# Patient Record
Sex: FEMALE | Race: WHITE | NOT HISPANIC OR LATINO | Employment: FULL TIME | URBAN - NONMETROPOLITAN AREA
[De-identification: names, ages, dates, MRNs, and addresses within clinical notes are randomized per-mention and may not be internally consistent; named-entity substitution may affect disease eponyms.]

---

## 2017-04-12 ENCOUNTER — OFFICE VISIT (OUTPATIENT)
Dept: URGENT CARE | Facility: PHYSICIAN GROUP | Age: 57
End: 2017-04-12
Payer: COMMERCIAL

## 2017-04-12 VITALS
HEIGHT: 68 IN | WEIGHT: 153 LBS | RESPIRATION RATE: 16 BRPM | OXYGEN SATURATION: 97 % | DIASTOLIC BLOOD PRESSURE: 82 MMHG | SYSTOLIC BLOOD PRESSURE: 138 MMHG | HEART RATE: 72 BPM | BODY MASS INDEX: 23.19 KG/M2 | TEMPERATURE: 98.7 F

## 2017-04-12 DIAGNOSIS — L30.9 DERMATITIS: ICD-10-CM

## 2017-04-12 DIAGNOSIS — L28.2 PRURITIC RASH: ICD-10-CM

## 2017-04-12 PROCEDURE — 99214 OFFICE O/P EST MOD 30 MIN: CPT | Performed by: PHYSICIAN ASSISTANT

## 2017-04-12 RX ORDER — TRIAMCINOLONE ACETONIDE 1 MG/G
1 CREAM TOPICAL 2 TIMES DAILY
Qty: 1 TUBE | Refills: 0 | Status: SHIPPED | OUTPATIENT
Start: 2017-04-12 | End: 2017-10-18

## 2017-04-12 RX ORDER — HYDROXYZINE HYDROCHLORIDE 25 MG/1
25 TABLET, FILM COATED ORAL 3 TIMES DAILY PRN
Qty: 30 TAB | Refills: 0 | Status: SHIPPED | OUTPATIENT
Start: 2017-04-12 | End: 2018-08-23

## 2017-04-12 NOTE — PROGRESS NOTES
Chief Complaint   Patient presents with   • Rash     x2days on both arms, itching, redness       HISTORY OF PRESENT ILLNESS: Patient is a 56 y.o. female who presents today because she has a rash on her left arm. It is itchy, red, bumpy. It has been going on for 2 days after she put some holistic medication on her arm. Denies any new contacts, otherwise    Patient Active Problem List    Diagnosis Date Noted   • Anxiety 11/06/2015   • Menopause syndrome 03/01/2012   • Hypertension    • Cervicalgia    • Tachycardia        Allergies:Review of patient's allergies indicates no known allergies.    Current Outpatient Prescriptions Ordered in [facility]   Medication Sig Dispense Refill   • triamcinolone acetonide (KENALOG) 0.1 % Cream Apply 1 Application to affected area(s) 2 times a day. 1 Tube 0   • hydrOXYzine (ATARAX) 25 MG Tab Take 1 Tab by mouth 3 times a day as needed for Itching. 30 Tab 0   • estradiol (ESTRACE) 0.5 MG tablet Take 1 Tab by mouth every day. 90 Tab 0   • lisinopril (PRINIVIL) 10 MG Tab Take 1 Tab by mouth every day. 90 Tab 0   • zolpidem (AMBIEN CR) 6.25 MG CR tablet Take 1 Tab by mouth at bedtime as needed for Sleep. 90 Tab 1   • Multiple Vitamin (MULTI-VITAMIN PO) Take  by mouth.     • Calcium 600 MG TABS Take 1,200 mg by mouth every day.       No current [facility]-ordered facility-administered medications on file.       Past Medical History   Diagnosis Date   • Cervicalgia    • Tachycardia, unspecified    • Shortness of breath    • Pap smear 03/13/2007   • Screening mammogram 02/26/2008   • Ultrasound scan abnormal 3/15/2007     Pelvic, enlarged uterus with miltiple masses in the uterine wall consisten with leiomyas   • Premenstrual tension syndromes    • Generalized anxiety disorder    • S/P VH (vaginal hysterectomy) 07/2007     secondary  to menorrhagia without BSO   • FH: colon cancer      maternal, typically diagnosed in 50's   • FH: stroke      paternal    • Essential hypertension, malignant    •  "Menopause syndrome 3/1/2012   • Hypertension    • Anxiety 11/6/2015       Social History   Substance Use Topics   • Smoking status: Never Smoker    • Smokeless tobacco: Never Used   • Alcohol Use: 1.2 oz/week     2 Standard drinks or equivalent per week       Family Status   Relation Status Death Age   • Mother Alive    • Father Alive      overweight     Family History   Problem Relation Age of Onset   • Hypertension Mother    • Arthritis Mother      severe osteoarthritis   • Hypertension Father        ROS:  Review of Systems   Constitutional: Negative for fever, chills, weight loss and malaise/fatigue.   HENT: Negative for ear pain, nosebleeds, congestion, sore throat and neck pain.    Eyes: Negative for blurred vision.   Respiratory: Negative for cough, sputum production, shortness of breath and wheezing.    Cardiovascular: Negative for chest pain, palpitations, orthopnea and leg swelling.   Gastrointestinal: Negative for heartburn, nausea, vomiting and abdominal pain.       Exam:  Blood pressure 138/82, pulse 72, temperature 37.1 °C (98.7 °F), resp. rate 16, height 1.715 m (5' 7.52\"), weight 69.4 kg (153 lb), SpO2 97 %, not currently breastfeeding.  General:  Well nourished, well developed female in NAD  Head:Normocephalic, atraumatic  Extremities: no clubbing, cyanosis, or edema.  Skin: On the dorsum of her left arm, between her wrist and her elbow, there is a mildly erythematous papular rash without any open sores, lesions or drainage Otherwise    Please note that this dictation was created using voice recognition software. I have made every reasonable attempt to correct obvious errors, but I expect that there are errors of grammar and possibly content that I did not discover before finalizing the note.    Assessment/Plan:  1. Dermatitis  triamcinolone acetonide (KENALOG) 0.1 % Cream   2. Pruritic rash  hydrOXYzine (ATARAX) 25 MG Tab       Followup with primary care in the next 7-10 days if not significantly " improving, return to the urgent care or go to the emergency room sooner for any worsening of symptoms.

## 2017-04-12 NOTE — MR AVS SNAPSHOT
"Fatoumata Gutiérrez   2017 4:20 PM   Office Visit   MRN: [id number]    Department:  [facility]   Dept Phone:  [phone]    Description:  Female : 1960   Provider:  Rivera Chester PA-C           Reason for Visit     Rash x2days on both arms, itching, redness      Allergies as of 2017     No Known Allergies      You were diagnosed with     Dermatitis   [204680]       Pruritic rash   [[id number]]         Vital Signs     Blood Pressure Pulse Temperature Respirations Height Weight    138/82 mmHg 72 37.1 °C (98.7 °F) 16 1.715 m (5' 7.52\") 69.4 kg (153 lb)    Body Mass Index Oxygen Saturation Breastfeeding? Smoking Status          23.60 kg/m2 97% No Never Smoker         Basic Information     Date Of Birth Sex Race Ethnicity Preferred Language    1960 Female White Non- English      Problem List              ICD-10-CM Priority Class Noted - Resolved    Hypertension I10   Unknown - Present    Cervicalgia M54.2   Unknown - Present    Tachycardia R00.0   Unknown - Present    Menopause syndrome N95.1   3/1/2012 - Present    Anxiety F41.9   2015 - Present      Health Maintenance        Date Due Completion Dates    IMM DTaP/Tdap/Td Vaccine (1 - Tdap) 2004    MAMMOGRAM 2015    PAP SMEAR 2016    COLONOSCOPY 2020 (Prv Comp)    Override on 2010: Previously completed ([facility], hemorrhoids, microscopic colitis, repeat 10 years)            Current Immunizations     INFLUENZA VACCINE H1N1 2010    Influenza Vaccine Pediatric 2010    Influenza Vaccine Quad Inj (Pf) 10/3/2015  9:05 AM    TD Vaccine 2004      Below and/or attached are the medications your provider expects you to take. Review all of your home medications and newly ordered medications with your provider and/or pharmacist. Follow medication instructions as directed by your provider and/or pharmacist. Please keep your medication list with you and share " with your provider. Update the information when medications are discontinued, doses are changed, or new medications (including over-the-counter products) are added; and carry medication information at all times in the event of emergency situations     Allergies:  No Known Allergies          Medications  Valid as of: April 12, 2017 -  5:07 PM    Generic Name Brand Name Tablet Size Instructions for use    Calcium (Tab) Calcium 600 MG Take 1,200 mg by mouth every day.        Estradiol (Tab) ESTRACE 0.5 MG Take 1 Tab by mouth every day.        HydrOXYzine HCl (Tab) ATARAX 25 MG Take 1 Tab by mouth 3 times a day as needed for Itching.        Lisinopril (Tab) PRINIVIL 10 MG Take 1 Tab by mouth every day.        Multiple Vitamin   Take  by mouth.        Triamcinolone Acetonide (Cream) KENALOG 0.1 % Apply 1 Application to affected area(s) 2 times a day.        Zolpidem Tartrate (Tab CR) AMBIEN CR 6.25 MG Take 1 Tab by mouth at bedtime as needed for Sleep.        .                 Medicines prescribed today were sent to:     [id number] - [facility] [place] - [address] AT [facility]    [address] [facility] [address]    Phone: [phone] Fax: [phone]    Open 24 Hours?: No    [facility] - [address] [facility] [address]    Phone: [phone] Fax: [phone]    Open 24 Hours?: No      Medication refill instructions:       If your prescription bottle indicates you have medication refills left, it is not necessary to call your provider’s office. Please contact your pharmacy and they will refill your medication.    If your prescription bottle indicates you do not have any refills left, you may request refills at any time through one of the following ways: The online [employer] system (except Urgent Care), by calling your provider’s office, or by asking your pharmacy to contact your provider’s office with a refill request.  Medication refills are processed only during regular business hours and may not be available until the next business day. Your provider may request additional information or to have a follow-up visit with you prior to refilling your medication.   *Please Note: Medication refills are assigned a new Rx number when refilled electronically. Your pharmacy may indicate that no refills were authorized even though a new prescription for the same medication is available at the pharmacy. Please request the medicine by name with the pharmacy before contacting your provider for a refill.           [employer] Access Code: Activation code not generated  Current Limbo Status: Active

## 2017-07-12 ENCOUNTER — OFFICE VISIT (OUTPATIENT)
Dept: MEDICAL GROUP | Facility: PHYSICIAN GROUP | Age: 57
End: 2017-07-12
Payer: COMMERCIAL

## 2017-07-12 VITALS
HEART RATE: 63 BPM | WEIGHT: 154 LBS | OXYGEN SATURATION: 96 % | BODY MASS INDEX: 24.17 KG/M2 | RESPIRATION RATE: 16 BRPM | HEIGHT: 67 IN | DIASTOLIC BLOOD PRESSURE: 90 MMHG | TEMPERATURE: 99.1 F | SYSTOLIC BLOOD PRESSURE: 136 MMHG

## 2017-07-12 DIAGNOSIS — F51.01 PRIMARY INSOMNIA: ICD-10-CM

## 2017-07-12 DIAGNOSIS — N95.1 MENOPAUSE SYNDROME: ICD-10-CM

## 2017-07-12 DIAGNOSIS — I10 ESSENTIAL HYPERTENSION: ICD-10-CM

## 2017-07-12 DIAGNOSIS — I47.10 PSVT (PAROXYSMAL SUPRAVENTRICULAR TACHYCARDIA): ICD-10-CM

## 2017-07-12 PROCEDURE — 99214 OFFICE O/P EST MOD 30 MIN: CPT | Performed by: INTERNAL MEDICINE

## 2017-07-12 RX ORDER — ESTRADIOL 0.5 MG/1
0.5 TABLET ORAL DAILY
Qty: 90 TAB | Refills: 0 | Status: SHIPPED | OUTPATIENT
Start: 2017-07-12 | End: 2017-07-19

## 2017-07-12 RX ORDER — LISINOPRIL 10 MG/1
10 TABLET ORAL DAILY
Qty: 90 TAB | Refills: 0 | Status: SHIPPED | OUTPATIENT
Start: 2017-07-12 | End: 2017-10-18 | Stop reason: SDUPTHER

## 2017-07-12 RX ORDER — METOPROLOL SUCCINATE 25 MG/1
25 TABLET, EXTENDED RELEASE ORAL DAILY
COMMUNITY
End: 2017-11-29 | Stop reason: SDUPTHER

## 2017-07-12 RX ORDER — ZOLPIDEM TARTRATE 6.25 MG/1
6.25 TABLET, FILM COATED, EXTENDED RELEASE ORAL NIGHTLY PRN
Qty: 90 TAB | Refills: 1 | Status: SHIPPED | OUTPATIENT
Start: 2017-07-12 | End: 2018-08-23

## 2017-07-12 ASSESSMENT — PAIN SCALES - GENERAL: PAINLEVEL: NO PAIN

## 2017-07-12 ASSESSMENT — PATIENT HEALTH QUESTIONNAIRE - PHQ9: CLINICAL INTERPRETATION OF PHQ2 SCORE: 0

## 2017-07-12 NOTE — MR AVS SNAPSHOT
"Fatoumata Gutiérrez   2017 9:00 AM   Office Visit   MRN: [id number]    Department:  [facility]   Dept Phone:  [phone]    Description:  Female : 1960   Provider:  Cyndie BLANK M.D.           Reason for Visit     Medication Refill lisinopril     Other pt would like bone density study, Mammo, PAP    Immunizations TDAP       Allergies as of 2017     No Known Allergies      Vital Signs     Blood Pressure Pulse Temperature Respirations Height Weight    136/90 mmHg 63 37.3 °C (99.1 °F) 16 1.702 m (5' 7.01\") 69.854 kg (154 lb)    Body Mass Index Oxygen Saturation Smoking Status             24.11 kg/m2 96% Never Smoker          Basic Information     Date Of Birth Sex Race Ethnicity Preferred Language    1960 Female White Non- English      Your appointments     Oct 24, 2017  7:15 AM   Adult Draw/Collection with [facility]   [facility] (--)    [id number] [place] [address]   [phone]            2017  4:00 PM   Established Patient with Cyndie BLANK M.D.   [facility] (--)    [address] [facility] [place] [id number] [phone]           You will be receiving a confirmation call a few days before your appointment from our automated call confirmation system.              Problem List              ICD-10-CM Priority Class Noted - Resolved    Hypertension I10   Unknown - Present    Cervicalgia M54.2   Unknown - Present    Tachycardia R00.0   Unknown - Present    Menopause syndrome N95.1   3/1/2012 - Present    Anxiety F41.9   2015 - Present      Health Maintenance        Date Due Completion Dates    IMM DTaP/Tdap/Td Vaccine (1 - Tdap) 2004    MAMMOGRAM 2015    PAP SMEAR 2016    IMM INFLUENZA (1) 2017 10/3/2015, 2010    COLONOSCOPY 2020 (Prv Comp)    Override on 2010: Previously completed ([facility], hemorrhoids, microscopic colitis, repeat 10 " years)            Current Immunizations     INFLUENZA VACCINE H1N1 2/1/2010    Influenza Vaccine Pediatric 2/1/2010    Influenza Vaccine Quad Inj (Pf) 10/3/2015  9:05 AM    MMR Vaccine 8/9/2000    TD Vaccine 1/1/2004      Below and/or attached are the medications your provider expects you to take. Review all of your home medications and newly ordered medications with your provider and/or pharmacist. Follow medication instructions as directed by your provider and/or pharmacist. Please keep your medication list with you and share with your provider. Update the information when medications are discontinued, doses are changed, or new medications (including over-the-counter products) are added; and carry medication information at all times in the event of emergency situations     Allergies:  No Known Allergies          Medications  Valid as of: July 12, 2017 -  9:52 AM    Generic Name Brand Name Tablet Size Instructions for use    Calcium (Tab) Calcium 600 MG Take 1,200 mg by mouth every day.        Estradiol (Tab) ESTRACE 0.5 MG Take 1 Tab by mouth every day.        HydrOXYzine HCl (Tab) ATARAX 25 MG Take 1 Tab by mouth 3 times a day as needed for Itching.        Lisinopril (Tab) PRINIVIL 10 MG Take 1 Tab by mouth every day.        Metoprolol Succinate (TABLET SR 24 HR) TOPROL XL 25 MG Take 25 mg by mouth every day.        Multiple Vitamin   Take  by mouth.        Triamcinolone Acetonide (Cream) KENALOG 0.1 % Apply 1 Application to affected area(s) 2 times a day.        Zolpidem Tartrate (Tab CR) AMBIEN CR 6.25 MG Take 1 Tab by mouth at bedtime as needed for Sleep.        .                 Medicines prescribed today were sent to:     [id number] - [place], [place] - [address] AT [facility] [address]    [address] [place] [address]    Phone: [phone] Fax: [phone]    Open 24 Hours?: No    [facility] - [place], [place] - [address] [place] [address] [address] [place]  [place] [address]    Phone: [phone] Fax: [phone]    Open 24 Hours?: No      Medication refill instructions:       If your prescription bottle indicates you have medication refills left, it is not necessary to call your provider’s office. Please contact your pharmacy and they will refill your medication.    If your prescription bottle indicates you do not have any refills left, you may request refills at any time through one of the following ways: The online [employer] system (except Urgent Care), by calling your provider’s office, or by asking your pharmacy to contact your provider’s office with a refill request. Medication refills are processed only during regular business hours and may not be available until the next business day. Your provider may request additional information or to have a follow-up visit with you prior to refilling your medication.   *Please Note: Medication refills are assigned a new Rx number when refilled electronically. Your pharmacy may indicate that no refills were authorized even though a new prescription for the same medication is available at the pharmacy. Please request the medicine by name with the pharmacy before contacting your provider for a refill.           Pervasip Access Code: Activation code not generated  Current Pervasip Status: Active

## 2017-07-13 NOTE — PROGRESS NOTES
Chief Complaint   Patient presents with   • Medication Refill     lisinopril    • Other     pt would like bone density study, Mammo, PAP   • Immunizations     TDAP        HISTORY OF PRESENT ILLNESS: Patient is a 56 y.o. female established patient who presents today to discuss the medical issues below.    Hypertension  Patient continues on lisinopril as well as low-dose beta-blockade for a PSVT. Not following at home denies headaches chest pain edema.    PSVT (paroxysmal supraventricular tachycardia) (CMS-HCC)  Patient reports diagnosed with PSVT, had seen cardiology cardiology, had medications instituted as metoprolol SR 25 mg once a day and reports she had been doing well until last week when she had a run of palpitations that lasted approximately one hour. She has a follow-up appointment with cardiology on August 25. Palpitations spontaneously resolved with resting.    Menopause syndrome  Patient is status post hysterectomy and wonders if she needs a Pap smear done. She's not having any significant sweats she is continuing on estradiol tablets 0.5 mg daily.      Patient Active Problem List    Diagnosis Date Noted   • Anxiety 11/06/2015   • Menopause syndrome 03/01/2012   • Hypertension    • Cervicalgia    • PSVT (paroxysmal supraventricular tachycardia) (CMS-[facility])        Allergies:Review of patient's allergies indicates no known allergies.    Current Outpatient Prescriptions   Medication Sig Dispense Refill   • metoprolol SR (TOPROL XL) 25 MG TABLET SR 24 HR Take 25 mg by mouth every day.     • estradiol (ESTRACE) 0.5 MG tablet Take 1 Tab by mouth every day. 90 Tab 0   • lisinopril (PRINIVIL) 10 MG Tab Take 1 Tab by mouth every day. 90 Tab 0   • zolpidem (AMBIEN CR) 6.25 MG CR tablet Take 1 Tab by mouth at bedtime as needed for Sleep. 90 Tab 1   • Multiple Vitamin (MULTI-VITAMIN PO) Take  by mouth.     • Calcium 600 MG TABS Take 1,200 mg by mouth every day.     • triamcinolone acetonide (KENALOG) 0.1 % Cream  "Apply 1 Application to affected area(s) 2 times a day. (Patient not taking: Reported on 7/12/2017) 1 Tube 0   • hydrOXYzine (ATARAX) 25 MG Tab Take 1 Tab by mouth 3 times a day as needed for Itching. (Patient not taking: Reported on 7/12/2017) 30 Tab 0     No current facility-administered medications for this visit.         Past Medical History   Diagnosis Date   • Cervicalgia    • Tachycardia, unspecified    • Shortness of breath    • Pap smear 03/13/2007   • Screening mammogram 02/26/2008   • Ultrasound scan abnormal 3/15/2007     Pelvic, enlarged uterus with miltiple masses in the uterine wall consisten with leiomyas   • Premenstrual tension syndromes    • Generalized anxiety disorder    • S/P VH (vaginal hysterectomy) 07/2007     secondary  to menorrhagia without BSO   • FH: colon cancer      maternal, typically diagnosed in 50's   • FH: stroke      paternal    • Essential hypertension, malignant    • Menopause syndrome 3/1/2012   • Hypertension    • Anxiety 11/6/2015       Social History   Substance Use Topics   • Smoking status: Never Smoker    • Smokeless tobacco: Never Used   • Alcohol Use: No       Family Status   Relation Status Death Age   • Mother Alive    • Father Alive      overweight     Family History   Problem Relation Age of Onset   • Hypertension Mother    • Arthritis Mother      severe osteoarthritis   • Hypertension Father        ROS:    Respiratory: Negative for cough, sputum production, shortness of breath or wheezing.    Cardiovascular: Negative for chest pain, palpitations, orthopnea, dyspnea with exertion or edema.   Gastrointestinal: Negative for GI upset, nausea, vomiting, abdominal pain, constipation or diarrhea.   Genitourinary: Negative for dysuria, urgency, hesitancy or frequency.       Exam:    Blood pressure 136/90, pulse 63, temperature 37.3 °C (99.1 °F), resp. rate 16, height 1.702 m (5' 7.01\"), weight 69.854 kg (154 lb), SpO2 96 %.  General:  Well nourished, well developed female " in NAD.  HENT: Normocephalic, bilateral TMs are intact, nasal and oral mucosa with no lesions,   Neck: Supple without bruit. Thyroid is not enlarged.  Pulmonary: Clear to ausculation and percussion.  Normal effort. No rales, rhonchi, or wheezing.  Cardiovascular: Regular rate and rhythm without murmur.   Abdomen: Normal bowel sounds soft and nontender no palpable liver spleen bladder mass.  Extremities: No LE edema noted.  Neuro: Grossly nonfocal.  Psych: Alert and oriented to person, place, and time. Appropriate mood and conversation.        This dictation was created using voice recognition software. I have made reasonable attempts to correct errors, however, errors of grammar and content may exist.          Assessment/Plan:    1. Essential hypertension  Borderline blood pressure discussed increasing dose of beta blocker for symptomatic PSVT. Ultimately she opts for monitoring with cardiology prior to medication changes. Lisinopril refill written    2. PSVT (paroxysmal supraventricular tachycardia) (CMS-HCC)  As discussed above for now monitoring clinically    3. Menopause syndrome  Schedule mammogram, Pap smear. She is status post hysterectomy however uncertain if her cervix remains or not. I recommended one Pap if we see absence of the cervix then she can go to when necessary Pap smears. Discuss estrogen use of follow-up. Bone density screening    4. Vaccination  Need for T dab order written.    5 Insomnia  Computers down and initially unable to do . Subsequent to her leaving the office visit  indicates no adverse use refill written.    Patient was seen for  25 minutes face to face of which more than 50% of the time was spent in counseling and coordination of care regarding the above problems.

## 2017-07-13 NOTE — ASSESSMENT & PLAN NOTE
Patient reports diagnosed with PSVT, had seen cardiology cardiology, had medications instituted as metoprolol SR 25 mg once a day and reports she had been doing well until last week when she had a run of palpitations that lasted approximately one hour. She has a follow-up appointment with cardiology on August 25. Palpitations spontaneously resolved with resting.

## 2017-07-13 NOTE — ASSESSMENT & PLAN NOTE
Patient continues on lisinopril as well as low-dose beta-blockade for a PSVT. Not following at home denies headaches chest pain edema.

## 2017-07-13 NOTE — ASSESSMENT & PLAN NOTE
Patient is status post hysterectomy and wonders if she needs a Pap smear done. She's not having any significant sweats she is continuing on estradiol tablets 0.5 mg daily.

## 2017-07-19 RX ORDER — ESTRADIOL 0.5 MG/1
TABLET ORAL
Qty: 90 TAB | Refills: 0 | Status: SHIPPED | OUTPATIENT
Start: 2017-07-19 | End: 2018-08-23

## 2017-07-19 NOTE — TELEPHONE ENCOUNTER
Was the patient seen in the last year in this department? Yes     Does patient have an active prescription for medications requested? No     Received Request Via: Pharmacy      Pt met protocol?: Yes, ov 7/12/17, pt has rx at local pharmacy but wants to get thru mail order #[id number]

## 2017-10-18 ENCOUNTER — OFFICE VISIT (OUTPATIENT)
Dept: URGENT CARE | Facility: PHYSICIAN GROUP | Age: 57
End: 2017-10-18
Payer: COMMERCIAL

## 2017-10-18 ENCOUNTER — HOSPITAL ENCOUNTER (OUTPATIENT)
Facility: MEDICAL CENTER | Age: 57
End: 2017-10-18
Attending: PHYSICIAN ASSISTANT
Payer: COMMERCIAL

## 2017-10-18 VITALS
DIASTOLIC BLOOD PRESSURE: 76 MMHG | TEMPERATURE: 98.1 F | OXYGEN SATURATION: 96 % | BODY MASS INDEX: 24.17 KG/M2 | RESPIRATION RATE: 16 BRPM | HEART RATE: 93 BPM | SYSTOLIC BLOOD PRESSURE: 126 MMHG | HEIGHT: 67 IN | WEIGHT: 154 LBS

## 2017-10-18 DIAGNOSIS — I10 ESSENTIAL HYPERTENSION: ICD-10-CM

## 2017-10-18 DIAGNOSIS — N30.00 ACUTE CYSTITIS WITHOUT HEMATURIA: ICD-10-CM

## 2017-10-18 DIAGNOSIS — Z13.220 SCREENING, LIPID: ICD-10-CM

## 2017-10-18 LAB
APPEARANCE UR: NORMAL
BILIRUB UR STRIP-MCNC: NORMAL MG/DL
COLOR UR AUTO: NORMAL
GLUCOSE UR STRIP.AUTO-MCNC: NORMAL MG/DL
KETONES UR STRIP.AUTO-MCNC: NORMAL MG/DL
LEUKOCYTE ESTERASE UR QL STRIP.AUTO: NORMAL
NITRITE UR QL STRIP.AUTO: NORMAL
PH UR STRIP.AUTO: 6 [PH] (ref 5–8)
PROT UR QL STRIP: 30 MG/DL
RBC UR QL AUTO: NORMAL
SP GR UR STRIP.AUTO: 1.01
UROBILINOGEN UR STRIP-MCNC: NORMAL MG/DL

## 2017-10-18 PROCEDURE — 99214 OFFICE O/P EST MOD 30 MIN: CPT | Performed by: PHYSICIAN ASSISTANT

## 2017-10-18 PROCEDURE — 87086 URINE CULTURE/COLONY COUNT: CPT

## 2017-10-18 PROCEDURE — 87186 SC STD MICRODIL/AGAR DIL: CPT

## 2017-10-18 PROCEDURE — 87077 CULTURE AEROBIC IDENTIFY: CPT

## 2017-10-18 PROCEDURE — 81002 URINALYSIS NONAUTO W/O SCOPE: CPT | Performed by: PHYSICIAN ASSISTANT

## 2017-10-18 RX ORDER — NITROFURANTOIN 25; 75 MG/1; MG/1
100 CAPSULE ORAL EVERY 12 HOURS
Qty: 10 CAP | Refills: 0 | Status: SHIPPED | OUTPATIENT
Start: 2017-10-18 | End: 2017-10-23

## 2017-10-18 RX ORDER — PHENAZOPYRIDINE HYDROCHLORIDE 200 MG/1
200 TABLET, FILM COATED ORAL 3 TIMES DAILY
Qty: 6 TAB | Refills: 0 | Status: SHIPPED | OUTPATIENT
Start: 2017-10-18 | End: 2017-10-20

## 2017-10-18 ASSESSMENT — ENCOUNTER SYMPTOMS
FEVER: 0
SWEATS: 0
ABDOMINAL PAIN: 1
NAUSEA: 0
CHILLS: 0
VOMITING: 0
FLANK PAIN: 0

## 2017-10-19 DIAGNOSIS — N30.00 ACUTE CYSTITIS WITHOUT HEMATURIA: ICD-10-CM

## 2017-10-19 RX ORDER — LISINOPRIL 10 MG/1
10 TABLET ORAL DAILY
Qty: 90 TAB | Refills: 0 | Status: SHIPPED | OUTPATIENT
Start: 2017-10-19 | End: 2017-11-29 | Stop reason: SDUPTHER

## 2017-10-19 NOTE — PROGRESS NOTES
Subjective:      Fatoumata Gutiérrez is a 57 y.o. female who presents with Dysuria (x1day back/abd pain)              One-day history of dysuria, urgency, frequency, and lower abdominal discomfort. No fevers, chills, or other complaints.      Dysuria    This is a new problem. The current episode started today. The problem occurs every urination. The problem has been waxing and waning. The quality of the pain is described as aching and burning. The pain is moderate. There has been no fever. Associated symptoms include frequency and urgency. Pertinent negatives include no chills, discharge, flank pain, hematuria, hesitancy, nausea, possible pregnancy, sweats or vomiting. She has tried nothing for the symptoms. The treatment provided no relief.       Review of Systems   Constitutional: Negative for chills and fever.   Gastrointestinal: Positive for abdominal pain. Negative for nausea and vomiting.   Genitourinary: Positive for dysuria, frequency and urgency. Negative for flank pain, hematuria and hesitancy.     Allergies:Review of patient's allergies indicates no known allergies.    Current Outpatient Prescriptions Ordered in [facility]   Medication Sig Dispense Refill   • nitrofurantoin monohydr macro (MACROBID) 100 MG Cap Take 1 Cap by mouth every 12 hours for 5 days. 10 Cap 0   • phenazopyridine (PYRIDIUM) 200 MG Tab Take 1 Tab by mouth 3 times a day for 2 days. 6 Tab 0   • estradiol (ESTRACE) 0.5 MG tablet Take 1 tablet by mouth  every day 90 Tab 0   • metoprolol SR (TOPROL XL) 25 MG TABLET SR 24 HR Take 25 mg by mouth every day.     • lisinopril (PRINIVIL) 10 MG Tab Take 1 Tab by mouth every day. 90 Tab 0   • zolpidem (AMBIEN CR) 6.25 MG CR tablet Take 1 Tab by mouth at bedtime as needed for Sleep. 90 Tab 1   • hydrOXYzine (ATARAX) 25 MG Tab Take 1 Tab by mouth 3 times a day as needed for Itching. 30 Tab 0   • Multiple Vitamin (MULTI-VITAMIN PO) Take  by mouth.     • Calcium 600 MG TABS Take 1,200 mg by mouth every day.    "    No current [facility]-ordered facility-administered medications on file.        Past Medical History:   Diagnosis Date   • Anxiety 11/6/2015   • Menopause syndrome 3/1/2012   • Screening mammogram 02/26/2008   • S/P VH (vaginal hysterectomy) 07/2007    secondary  to menorrhagia without BSO   • Ultrasound scan abnormal 3/15/2007    Pelvic, enlarged uterus with miltiple masses in the uterine wall consisten with leiomyas   • Pap smear 03/13/2007   • Cervicalgia    • Essential hypertension, malignant    • FH: colon cancer     maternal, typically diagnosed in 50's   • FH: stroke     paternal    • Generalized anxiety disorder    • Hypertension    • Premenstrual tension syndromes    • Shortness of breath    • Tachycardia, unspecified        Social History   Substance Use Topics   • Smoking status: Never Smoker   • Smokeless tobacco: Never Used   • Alcohol use No       Family Status   Relation Status   • Mother Alive   • Father Alive    overweight     Family History   Problem Relation Age of Onset   • Hypertension Mother    • Arthritis Mother      severe osteoarthritis   • Hypertension Father           Objective:     /76   Pulse 93   Temp 36.7 °C (98.1 °F)   Resp 16   Ht 1.702 m (5' 7.01\")   Wt 69.9 kg (154 lb)   SpO2 96%   Breastfeeding? No   BMI 24.11 kg/m²      Physical Exam   Constitutional: She is oriented to person, place, and time. She appears well-developed and well-nourished. No distress.   HENT:   Head: Normocephalic and atraumatic.   Eyes: Right eye exhibits no discharge. Left eye exhibits no discharge.   Neck: Normal range of motion. Neck supple.   Cardiovascular: Normal rate and regular rhythm.    Pulmonary/Chest: Effort normal and breath sounds normal.   Abdominal: Soft. Bowel sounds are normal. She exhibits no distension. There is tenderness (very mild, suprapubic). There is no guarding.   No CVA tenderness   Neurological: She is alert and oriented to person, place, and time.   Skin: Skin is warm " and dry. She is not diaphoretic.   Psychiatric: She has a normal mood and affect. Her behavior is normal. Judgment and thought content normal.   Nursing note and vitals reviewed.    Labs: Urinalysis positive for leukocytes, nitrates, blood, and protein          Assessment/Plan:     1. Acute cystitis without hematuria  POCT Urinalysis    Urine Culture    nitrofurantoin monohydr macro (MACROBID) 100 MG Cap    phenazopyridine (PYRIDIUM) 200 MG Tab    Symptoms for one day, worsening. Urine positive for leukocytes, nitrates, and blood. Start Macrobid. Culture urine. Follow-up with PCP as needed.       [employer] Interactive Patient Education given: UTI    Please note that this dictation was created using voice recognition software. I have made every reasonable attempt to correct obvious errors, but I expect that there are errors of grammar and possibly content that I did not discover before finalizing the note.

## 2017-10-19 NOTE — PATIENT INSTRUCTIONS
Urinary Tract Infection  A urinary tract infection (UTI) can occur any place along the urinary tract. The tract includes the kidneys, ureters, bladder, and urethra. A type of germ called bacteria often causes a UTI. UTIs are often helped with antibiotic medicine.   HOME CARE   · If given, take antibiotics as told by your doctor. Finish them even if you start to feel better.  · Drink enough fluids to keep your pee (urine) clear or pale yellow.  · Avoid tea, drinks with caffeine, and bubbly (carbonated) drinks.  · Pee often. Avoid holding your pee in for a long time.  · Pee before and after having sex (intercourse).  · Wipe from front to back after you poop (bowel movement) if you are a woman. Use each tissue only once.  GET HELP RIGHT AWAY IF:   · You have back pain.  · You have lower belly (abdominal) pain.  · You have chills.  · You feel sick to your stomach (nauseous).  · You throw up (vomit).  · Your burning or discomfort with peeing does not go away.  · You have a fever.  · Your symptoms are not better in 3 days.  MAKE SURE YOU:   · Understand these instructions.  · Will watch your condition.  · Will get help right away if you are not doing well or get worse.     This information is not intended to replace advice given to you by your health care provider. Make sure you discuss any questions you have with your health care provider.     Document Released: 06/05/2009 Document Revised: 01/08/2016 Document Reviewed: 07/18/2013  [employer] Interactive Patient Education ©2016 [employer]

## 2017-10-19 NOTE — TELEPHONE ENCOUNTER
Refill is written, patient has OV 11/3/17, I recommend lab draw prior to the OV, please schedule that.

## 2017-10-21 LAB
BACTERIA UR CULT: ABNORMAL
SIGNIFICANT IND 70042: ABNORMAL
SOURCE SOURCE: ABNORMAL

## 2017-10-24 ENCOUNTER — HOSPITAL ENCOUNTER (OUTPATIENT)
Dept: LAB | Facility: MEDICAL CENTER | Age: 57
End: 2017-10-24
Attending: INTERNAL MEDICINE
Payer: COMMERCIAL

## 2017-10-24 DIAGNOSIS — Z13.220 SCREENING, LIPID: ICD-10-CM

## 2017-10-24 DIAGNOSIS — I10 ESSENTIAL HYPERTENSION: ICD-10-CM

## 2017-10-24 LAB
BASOPHILS # BLD AUTO: 0.4 % (ref 0–1.8)
BASOPHILS # BLD: 0.04 K/UL (ref 0–0.12)
EOSINOPHIL # BLD AUTO: 0.1 K/UL (ref 0–0.51)
EOSINOPHIL NFR BLD: 1 % (ref 0–6.9)
ERYTHROCYTE [DISTWIDTH] IN BLOOD BY AUTOMATED COUNT: 42.2 FL (ref 35.9–50)
HCT VFR BLD AUTO: 43.6 % (ref 37–47)
HGB BLD-MCNC: 14.1 G/DL (ref 12–16)
IMM GRANULOCYTES # BLD AUTO: 0.04 K/UL (ref 0–0.11)
IMM GRANULOCYTES NFR BLD AUTO: 0.4 % (ref 0–0.9)
LYMPHOCYTES # BLD AUTO: 1.34 K/UL (ref 1–4.8)
LYMPHOCYTES NFR BLD: 13.8 % (ref 22–41)
MCH RBC QN AUTO: 29.9 PG (ref 27–33)
MCHC RBC AUTO-ENTMCNC: 32.3 G/DL (ref 33.6–35)
MCV RBC AUTO: 92.4 FL (ref 81.4–97.8)
MONOCYTES # BLD AUTO: 0.69 K/UL (ref 0–0.85)
MONOCYTES NFR BLD AUTO: 7.1 % (ref 0–13.4)
NEUTROPHILS # BLD AUTO: 7.48 K/UL (ref 2–7.15)
NEUTROPHILS NFR BLD: 77.3 % (ref 44–72)
NRBC # BLD AUTO: 0 K/UL
NRBC BLD AUTO-RTO: 0 /100 WBC
PLATELET # BLD AUTO: 305 K/UL (ref 164–446)
PMV BLD AUTO: 10.3 FL (ref 9–12.9)
RBC # BLD AUTO: 4.72 M/UL (ref 4.2–5.4)
WBC # BLD AUTO: 9.7 K/UL (ref 4.8–10.8)

## 2017-10-24 PROCEDURE — 80053 COMPREHEN METABOLIC PANEL: CPT

## 2017-10-24 PROCEDURE — 36415 COLL VENOUS BLD VENIPUNCTURE: CPT

## 2017-10-24 PROCEDURE — 85025 COMPLETE CBC W/AUTO DIFF WBC: CPT

## 2017-10-24 PROCEDURE — 80061 LIPID PANEL: CPT

## 2017-10-25 LAB
ALBUMIN SERPL BCP-MCNC: 3.9 G/DL (ref 3.2–4.9)
ALBUMIN/GLOB SERPL: 1.3 G/DL
ALP SERPL-CCNC: 102 U/L (ref 30–99)
ALT SERPL-CCNC: 67 U/L (ref 2–50)
ANION GAP SERPL CALC-SCNC: 6 MMOL/L (ref 0–11.9)
AST SERPL-CCNC: 36 U/L (ref 12–45)
BILIRUB SERPL-MCNC: 0.6 MG/DL (ref 0.1–1.5)
BUN SERPL-MCNC: 17 MG/DL (ref 8–22)
CALCIUM SERPL-MCNC: 9.1 MG/DL (ref 8.5–10.5)
CHLORIDE SERPL-SCNC: 104 MMOL/L (ref 96–112)
CHOLEST SERPL-MCNC: 167 MG/DL (ref 100–199)
CO2 SERPL-SCNC: 27 MMOL/L (ref 20–33)
CREAT SERPL-MCNC: 0.81 MG/DL (ref 0.5–1.4)
GFR SERPL CREATININE-BSD FRML MDRD: >60 ML/MIN/1.73 M 2
GLOBULIN SER CALC-MCNC: 2.9 G/DL (ref 1.9–3.5)
GLUCOSE SERPL-MCNC: 101 MG/DL (ref 65–99)
HDLC SERPL-MCNC: 61 MG/DL
LDLC SERPL CALC-MCNC: 88 MG/DL
POTASSIUM SERPL-SCNC: 4.3 MMOL/L (ref 3.6–5.5)
PROT SERPL-MCNC: 6.8 G/DL (ref 6–8.2)
SODIUM SERPL-SCNC: 137 MMOL/L (ref 135–145)
TRIGL SERPL-MCNC: 89 MG/DL (ref 0–149)

## 2017-11-02 ENCOUNTER — TELEPHONE (OUTPATIENT)
Dept: MEDICAL GROUP | Facility: PHYSICIAN GROUP | Age: 57
End: 2017-11-02

## 2017-11-29 ENCOUNTER — OFFICE VISIT (OUTPATIENT)
Dept: MEDICAL GROUP | Facility: PHYSICIAN GROUP | Age: 57
End: 2017-11-29
Payer: COMMERCIAL

## 2017-11-29 VITALS
BODY MASS INDEX: 23.49 KG/M2 | SYSTOLIC BLOOD PRESSURE: 130 MMHG | OXYGEN SATURATION: 94 % | HEIGHT: 68 IN | TEMPERATURE: 97.8 F | HEART RATE: 67 BPM | DIASTOLIC BLOOD PRESSURE: 80 MMHG | WEIGHT: 155 LBS | RESPIRATION RATE: 16 BRPM

## 2017-11-29 DIAGNOSIS — N95.1 MENOPAUSE SYNDROME: ICD-10-CM

## 2017-11-29 DIAGNOSIS — Z23 NEEDS FLU SHOT: ICD-10-CM

## 2017-11-29 DIAGNOSIS — R73.01 FASTING HYPERGLYCEMIA: ICD-10-CM

## 2017-11-29 DIAGNOSIS — I47.10 PSVT (PAROXYSMAL SUPRAVENTRICULAR TACHYCARDIA): ICD-10-CM

## 2017-11-29 DIAGNOSIS — I10 ESSENTIAL HYPERTENSION: ICD-10-CM

## 2017-11-29 DIAGNOSIS — Z23 NEED FOR TDAP VACCINATION: ICD-10-CM

## 2017-11-29 PROCEDURE — 90472 IMMUNIZATION ADMIN EACH ADD: CPT | Performed by: INTERNAL MEDICINE

## 2017-11-29 PROCEDURE — 90686 IIV4 VACC NO PRSV 0.5 ML IM: CPT | Performed by: INTERNAL MEDICINE

## 2017-11-29 PROCEDURE — 90471 IMMUNIZATION ADMIN: CPT | Performed by: INTERNAL MEDICINE

## 2017-11-29 PROCEDURE — 90715 TDAP VACCINE 7 YRS/> IM: CPT | Performed by: INTERNAL MEDICINE

## 2017-11-29 PROCEDURE — 99214 OFFICE O/P EST MOD 30 MIN: CPT | Mod: 25 | Performed by: INTERNAL MEDICINE

## 2017-11-29 RX ORDER — LISINOPRIL 10 MG/1
10 TABLET ORAL DAILY
Qty: 90 TAB | Refills: 3 | Status: SHIPPED | OUTPATIENT
Start: 2017-11-29 | End: 2018-08-23 | Stop reason: SDUPTHER

## 2017-11-29 RX ORDER — METOPROLOL SUCCINATE 25 MG/1
25 TABLET, EXTENDED RELEASE ORAL DAILY
Qty: 90 TAB | Refills: 3 | Status: SHIPPED | OUTPATIENT
Start: 2017-11-29 | End: 2018-08-23

## 2017-11-29 ASSESSMENT — PAIN SCALES - GENERAL: PAINLEVEL: NO PAIN

## 2017-11-29 NOTE — ASSESSMENT & PLAN NOTE
Patient here for follow-up has had fasting glucose levels borderline. She is working on a low carbohydrate diet exercising regularly. Denies polydipsia polyuria.

## 2017-11-29 NOTE — ASSESSMENT & PLAN NOTE
Rarely following at home, feels good.  Continues on the lisinopril 10 mg daily with the metoprolol SR 25 mg, rare palpitation as discussed

## 2017-11-29 NOTE — PROGRESS NOTES
Chief Complaint   Patient presents with   • Thyroid Problem     lab results    • Immunizations     flu       HISTORY OF PRESENT ILLNESS: Patient is a 57 y.o. female established patient who presents today to discuss the medical issues below.    PSVT (paroxysmal supraventricular tachycardia) (CMS-HCC)  Patient states no recent palpitations, continues on the low dose metoprolol.  Patient continues to see Dr Jacobs.      Hypertension  Rarely following at home, feels good.  Continues on the lisinopril 10 mg daily with the metoprolol SR 25 mg, rare palpitation as discussed     Menopause syndrome  Patient wonders about the estradiol 0.5 mg.      Fasting hyperglycemia  Patient here for follow-up has had fasting glucose levels borderline. She is working on a low carbohydrate diet exercising regularly. Denies polydipsia polyuria.      Patient Active Problem List    Diagnosis Date Noted   • Fasting hyperglycemia 11/29/2017   • Anxiety 11/06/2015   • Menopause syndrome 03/01/2012   • Hypertension    • Cervicalgia    • PSVT (paroxysmal supraventricular tachycardia) (CMS-HCC)        Allergies:Patient has no known allergies.    Current Outpatient Prescriptions   Medication Sig Dispense Refill   • lisinopril (PRINIVIL) 10 MG Tab Take 1 Tab by mouth every day. 90 Tab 0   • estradiol (ESTRACE) 0.5 MG tablet Take 1 tablet by mouth  every day 90 Tab 0   • metoprolol SR (TOPROL XL) 25 MG TABLET SR 24 HR Take 25 mg by mouth every day.     • Multiple Vitamin (MULTI-VITAMIN PO) Take  by mouth.     • Calcium 600 MG TABS Take 1,200 mg by mouth every day.     • zolpidem (AMBIEN CR) 6.25 MG CR tablet Take 1 Tab by mouth at bedtime as needed for Sleep. 90 Tab 1   • hydrOXYzine (ATARAX) 25 MG Tab Take 1 Tab by mouth 3 times a day as needed for Itching. 30 Tab 0     No current facility-administered medications for this visit.          Past Medical History:   Diagnosis Date   • Anxiety 11/6/2015   • Cervicalgia    • Essential hypertension,  "malignant    • FH: colon cancer     maternal, typically diagnosed in 50's   • FH: stroke     paternal    • Generalized anxiety disorder    • Hypertension    • Menopause syndrome 3/1/2012   • Pap smear 03/13/2007   • Premenstrual tension syndromes    • S/P VH (vaginal hysterectomy) 07/2007    secondary  to menorrhagia without BSO   • Screening mammogram 02/26/2008   • Shortness of breath    • Tachycardia, unspecified    • Ultrasound scan abnormal 3/15/2007    Pelvic, enlarged uterus with miltiple masses in the uterine wall consisten with leiomyas       Social History   Substance Use Topics   • Smoking status: Never Smoker   • Smokeless tobacco: Never Used   • Alcohol use 1.2 oz/week     2 Standard drinks or equivalent per week      Comment: rare       Family Status   Relation Status   • Mother Alive   • Father Alive    overweight     Family History   Problem Relation Age of Onset   • Hypertension Mother    • Arthritis Mother      severe osteoarthritis   • Hypertension Father        ROS:    Respiratory: Negative for cough, sputum production, shortness of breath or wheezing.    Cardiovascular: Negative for chest pain, palpitations, orthopnea, dyspnea with exertion or edema.   Gastrointestinal: Negative for GI upset, nausea, vomiting, abdominal pain, constipation or diarrhea.   Genitourinary: Negative for dysuria, urgency, hesitancy or frequency.       Exam:    Blood pressure 130/80, pulse 67, temperature 36.6 °C (97.8 °F), resp. rate 16, height 1.715 m (5' 7.5\"), weight 70.3 kg (155 lb), SpO2 94 %.  General:  Well nourished, well developed female in NAD.  HENT: Normocephalic, bilateral TMs are intact, nasal and oral mucosa with no lesions,   Neck: Supple without bruit. Thyroid is not enlarged.  Pulmonary: Clear to ausculation and percussion.  Normal effort. No rales, rhonchi, or wheezing.  Cardiovascular: Regular rate and rhythm without murmur.   Abdomen: Normal bowel sounds soft and nontender no palpable liver spleen " bladder mass.  Extremities: No LE edema noted.  Neuro: Grossly nonfocal.  Psych: Alert and oriented to person, place, and time. Appropriate mood and conversation.    LABS: Results reviewed and discussed with the patient, questions answered.      This dictation was created using voice recognition software. I have made reasonable attempts to correct errors, however, errors of grammar and content may exist.          Assessment/Plan:    1. PSVT (paroxysmal supraventricular tachycardia) (CMS-HCC)  Minimal breakthrough. Discussed option for increasing the beta blockade. For now she will just utilize when necessary extra doses and monitor blood pressure.    2. Essential hypertension  Did see cardiology in July continuing on present regime. Home blood pressure reading and consideration for increased focus on beta blocker and decrease on the ACE inhibitor when necessary breakthrough palpitations discussed    3. Menopause syndrome  Reviewed NIH concerns about estrogens. She will attempt gradual taper to discontinuation as tolerated    4. Needs flu shot    - INFLUENZA VACCINE QUAD INJ >3Y(PF)    5. Need for Tdap vaccination    - DTAP VACCINE <8YO IM    6. Fasting hyperglycemia  Reviewed indications of the borderline blood sugar. Check postprandial glucose.  - BASIC METABOLIC PANEL; Future    Patient was seen for  25 minutes face to face of which more than 50% of the time was spent in counseling and coordination of care regarding the above problems.

## 2017-11-29 NOTE — ASSESSMENT & PLAN NOTE
Patient states no recent palpitations, continues on the low dose metoprolol.  Patient continues to see Dr Jacobs.

## 2018-06-06 ENCOUNTER — OFFICE VISIT (OUTPATIENT)
Dept: MEDICAL GROUP | Facility: PHYSICIAN GROUP | Age: 58
End: 2018-06-06
Payer: COMMERCIAL

## 2018-06-06 VITALS
HEART RATE: 72 BPM | DIASTOLIC BLOOD PRESSURE: 70 MMHG | OXYGEN SATURATION: 93 % | RESPIRATION RATE: 16 BRPM | SYSTOLIC BLOOD PRESSURE: 138 MMHG | TEMPERATURE: 99 F | BODY MASS INDEX: 23.95 KG/M2 | WEIGHT: 158 LBS | HEIGHT: 68 IN

## 2018-06-06 DIAGNOSIS — F41.9 ANXIETY: ICD-10-CM

## 2018-06-06 DIAGNOSIS — R73.01 FASTING HYPERGLYCEMIA: ICD-10-CM

## 2018-06-06 DIAGNOSIS — N95.1 MENOPAUSE SYNDROME: ICD-10-CM

## 2018-06-06 DIAGNOSIS — Z12.39 BREAST CANCER SCREENING: ICD-10-CM

## 2018-06-06 DIAGNOSIS — Z13.220 SCREENING CHOLESTEROL LEVEL: ICD-10-CM

## 2018-06-06 DIAGNOSIS — E55.9 VITAMIN D DEFICIENCY: ICD-10-CM

## 2018-06-06 DIAGNOSIS — I10 ESSENTIAL HYPERTENSION: ICD-10-CM

## 2018-06-06 DIAGNOSIS — Z12.31 BREAST CANCER SCREENING BY MAMMOGRAM: ICD-10-CM

## 2018-06-06 DIAGNOSIS — I47.10 PSVT (PAROXYSMAL SUPRAVENTRICULAR TACHYCARDIA): ICD-10-CM

## 2018-06-06 PROCEDURE — 99214 OFFICE O/P EST MOD 30 MIN: CPT | Performed by: INTERNAL MEDICINE

## 2018-06-06 NOTE — ASSESSMENT & PLAN NOTE
Patient states anxiety and sleep are doing much better, she is using some CBD oil and that is helpful. Not using the atarax, group home has helped the most.

## 2018-06-06 NOTE — PROGRESS NOTES
Chief Complaint   Patient presents with   • Hyperlipidemia     med refills    • Orders Needed     Mammo        HISTORY OF PRESENT ILLNESS: Patient is a 57 y.o. female established patient who presents today to discuss the medical issues below.    Hypertension  Patient stopped the lisinopril about 1 week ago after visit with the cardiologist.  Feels fine, no palpitations.      Menopause syndrome  patient had been taking 1/2 tab q OD and has had no problems.  Stopped taking about 1 week ago.      Anxiety  Patient states anxiety and sleep are doing much better, she is using some CBD oil and that is helpful. Not using the atarax, senior care has helped the most.      Fasting hyperglycemia  Patient working on diet.        Patient Active Problem List    Diagnosis Date Noted   • Fasting hyperglycemia 11/29/2017   • Anxiety 11/06/2015   • Menopause syndrome 03/01/2012   • Hypertension    • Cervicalgia    • PSVT (paroxysmal supraventricular tachycardia) (CMS-[facility])        Allergies:Patient has no known allergies.    Current Outpatient Prescriptions   Medication Sig Dispense Refill   • metoprolol SR (TOPROL XL) 25 MG TABLET SR 24 HR Take 1 Tab by mouth every day. 90 Tab 3   • Multiple Vitamin (MULTI-VITAMIN PO) Take  by mouth.     • Calcium 600 MG TABS Take 1,200 mg by mouth every day.     • lisinopril (PRINIVIL) 10 MG Tab Take 1 Tab by mouth every day. 90 Tab 3   • estradiol (ESTRACE) 0.5 MG tablet Take 1 tablet by mouth  every day 90 Tab 0   • zolpidem (AMBIEN CR) 6.25 MG CR tablet Take 1 Tab by mouth at bedtime as needed for Sleep. 90 Tab 1   • hydrOXYzine (ATARAX) 25 MG Tab Take 1 Tab by mouth 3 times a day as needed for Itching. 30 Tab 0     No current facility-administered medications for this visit.          Past Medical History:   Diagnosis Date   • Anxiety 11/6/2015   • Cervicalgia    • Essential hypertension, malignant    • FH: colon cancer     maternal, typically diagnosed in 50's   • FH: stroke     paternal    •  "Generalized anxiety disorder    • Hypertension    • Menopause syndrome 3/1/2012   • Pap smear 03/13/2007   • Premenstrual tension syndromes    • S/P VH (vaginal hysterectomy) 07/2007    secondary  to menorrhagia without BSO   • Screening mammogram 02/26/2008   • Shortness of breath    • Tachycardia, unspecified    • Ultrasound scan abnormal 3/15/2007    Pelvic, enlarged uterus with miltiple masses in the uterine wall consisten with leiomyas       Social History   Substance Use Topics   • Smoking status: Never Smoker   • Smokeless tobacco: Never Used   • Alcohol use 1.2 oz/week     2 Standard drinks or equivalent per week      Comment: rare       Family Status   Relation Status   • Mother Alive   • Father Alive    overweight     Family History   Problem Relation Age of Onset   • Hypertension Mother    • Arthritis Mother      severe osteoarthritis   • Hypertension Father        ROS:    Respiratory: Negative for cough, sputum production, shortness of breath or wheezing.    Cardiovascular: Negative for chest pain, palpitations, orthopnea, dyspnea with exertion or edema.   Gastrointestinal: Negative for GI upset, nausea, vomiting, abdominal pain, constipation or diarrhea.   Genitourinary: Negative for dysuria, urgency, hesitancy or frequency.       Exam:    Blood pressure 138/70, pulse 72, temperature 37.2 °C (99 °F), resp. rate 16, height 1.715 m (5' 7.5\"), weight 71.7 kg (158 lb), SpO2 93 %.  General:  Well nourished, well developed female in NAD.  HENT: Normocephalic, bilateral TMs are intact, nasal and oral mucosa with no lesions,   Neck: Supple without bruit. Thyroid is not enlarged.  Pulmonary: Clear to ausculation and percussion.  Normal effort. No rales, rhonchi, or wheezing.  Cardiovascular: Regular rate and rhythm without murmur.   Abdomen: Normal bowel sounds soft and nontender no palpable liver spleen bladder mass.  Extremities: No LE edema noted.  Neuro: Grossly nonfocal.  Psych: Alert and oriented to " person, place, and time. Appropriate mood and conversation.    LABS: Results reviewed and discussed with the patient, questions answered.      This dictation was created using voice recognition software. I have made reasonable attempts to correct errors, however, errors of grammar and content may exist.          Assessment/Plan:    1. Essential hypertension  Pressure borderline here she is following this at home with discontinuation of the lisinopril.  Continue to monitor, reviewed cardiology recommendations.  She continues on her metoprolol with no symptomatic breakthrough.  - COMP METABOLIC PANEL; Future  - CBC WITH DIFFERENTIAL; Future    2. Menopause syndrome  Has tapered to discontinuation of the estrogen tolerating well ongoing monitoring.    3. Breast cancer screening  NIH recommendations schedule mammogram    4. Anxiety  Much improved with her long-term support    5. Fasting hyperglycemia  Patient modifying lifestyle will check fasting glucose in 3 months.  Additionally assess cholesterol levels.  - LIPID PROFILE; Future    6. Breast cancer screening by mammogram  As above.  - MA-SCREEN MAMMO W/CAD-BILAT; Future    7. PSVT (paroxysmal supraventricular tachycardia) (CMS-HCC)  No breakthrough symptomatology currently on 25 of metoprolol daily.  - TSH WITH REFLEX TO FT4; Future    8. Vitamin D deficiency  Ongoing supplementation and monitoring.  - VITAMIN D,25 HYDROXY; Future    9. Screening cholesterol level  Screening recommendations discussed schedule labs  - LIPID PROFILE; Future       Patient was seen for  25 minutes face to face of which more than 50% of the time was spent in counseling and coordination of care regarding the above problems.

## 2018-06-06 NOTE — ASSESSMENT & PLAN NOTE
Patient stopped the lisinopril about 1 week ago after visit with the cardiologist.  Feels fine, no palpitations.

## 2018-08-16 ENCOUNTER — HOSPITAL ENCOUNTER (OUTPATIENT)
Dept: LAB | Facility: MEDICAL CENTER | Age: 58
End: 2018-08-16
Attending: INTERNAL MEDICINE
Payer: COMMERCIAL

## 2018-08-16 DIAGNOSIS — I10 ESSENTIAL HYPERTENSION: ICD-10-CM

## 2018-08-16 DIAGNOSIS — Z13.220 SCREENING CHOLESTEROL LEVEL: ICD-10-CM

## 2018-08-16 DIAGNOSIS — R73.01 FASTING HYPERGLYCEMIA: ICD-10-CM

## 2018-08-16 DIAGNOSIS — E55.9 VITAMIN D DEFICIENCY: ICD-10-CM

## 2018-08-16 DIAGNOSIS — I47.10 PSVT (PAROXYSMAL SUPRAVENTRICULAR TACHYCARDIA): ICD-10-CM

## 2018-08-16 LAB
25(OH)D3 SERPL-MCNC: 26 NG/ML (ref 30–100)
ALBUMIN SERPL BCP-MCNC: 4.1 G/DL (ref 3.2–4.9)
ALBUMIN/GLOB SERPL: 1.4 G/DL
ALP SERPL-CCNC: 59 U/L (ref 30–99)
ALT SERPL-CCNC: 14 U/L (ref 2–50)
ANION GAP SERPL CALC-SCNC: 8 MMOL/L (ref 0–11.9)
AST SERPL-CCNC: 18 U/L (ref 12–45)
BASOPHILS # BLD AUTO: 0.8 % (ref 0–1.8)
BASOPHILS # BLD: 0.04 K/UL (ref 0–0.12)
BILIRUB SERPL-MCNC: 1.1 MG/DL (ref 0.1–1.5)
BUN SERPL-MCNC: 17 MG/DL (ref 8–22)
CALCIUM SERPL-MCNC: 9.1 MG/DL (ref 8.5–10.5)
CHLORIDE SERPL-SCNC: 107 MMOL/L (ref 96–112)
CHOLEST SERPL-MCNC: 178 MG/DL (ref 100–199)
CO2 SERPL-SCNC: 24 MMOL/L (ref 20–33)
CREAT SERPL-MCNC: 0.87 MG/DL (ref 0.5–1.4)
EOSINOPHIL # BLD AUTO: 0.09 K/UL (ref 0–0.51)
EOSINOPHIL NFR BLD: 1.9 % (ref 0–6.9)
ERYTHROCYTE [DISTWIDTH] IN BLOOD BY AUTOMATED COUNT: 40.5 FL (ref 35.9–50)
GLOBULIN SER CALC-MCNC: 3 G/DL (ref 1.9–3.5)
GLUCOSE SERPL-MCNC: 98 MG/DL (ref 65–99)
HCT VFR BLD AUTO: 45 % (ref 37–47)
HDLC SERPL-MCNC: 64 MG/DL
HGB BLD-MCNC: 14.7 G/DL (ref 12–16)
IMM GRANULOCYTES # BLD AUTO: 0.01 K/UL (ref 0–0.11)
IMM GRANULOCYTES NFR BLD AUTO: 0.2 % (ref 0–0.9)
LDLC SERPL CALC-MCNC: 91 MG/DL
LYMPHOCYTES # BLD AUTO: 1.37 K/UL (ref 1–4.8)
LYMPHOCYTES NFR BLD: 28.3 % (ref 22–41)
MCH RBC QN AUTO: 29.5 PG (ref 27–33)
MCHC RBC AUTO-ENTMCNC: 32.7 G/DL (ref 33.6–35)
MCV RBC AUTO: 90.4 FL (ref 81.4–97.8)
MONOCYTES # BLD AUTO: 0.27 K/UL (ref 0–0.85)
MONOCYTES NFR BLD AUTO: 5.6 % (ref 0–13.4)
NEUTROPHILS # BLD AUTO: 3.06 K/UL (ref 2–7.15)
NEUTROPHILS NFR BLD: 63.2 % (ref 44–72)
NRBC # BLD AUTO: 0 K/UL
NRBC BLD-RTO: 0 /100 WBC
PLATELET # BLD AUTO: 273 K/UL (ref 164–446)
PMV BLD AUTO: 10.3 FL (ref 9–12.9)
POTASSIUM SERPL-SCNC: 4.2 MMOL/L (ref 3.6–5.5)
PROT SERPL-MCNC: 7.1 G/DL (ref 6–8.2)
RBC # BLD AUTO: 4.98 M/UL (ref 4.2–5.4)
SODIUM SERPL-SCNC: 139 MMOL/L (ref 135–145)
TRIGL SERPL-MCNC: 114 MG/DL (ref 0–149)
TSH SERPL DL<=0.005 MIU/L-ACNC: 1.32 UIU/ML (ref 0.38–5.33)
WBC # BLD AUTO: 4.8 K/UL (ref 4.8–10.8)

## 2018-08-16 PROCEDURE — 80061 LIPID PANEL: CPT

## 2018-08-16 PROCEDURE — 85025 COMPLETE CBC W/AUTO DIFF WBC: CPT

## 2018-08-16 PROCEDURE — 84443 ASSAY THYROID STIM HORMONE: CPT

## 2018-08-16 PROCEDURE — 80053 COMPREHEN METABOLIC PANEL: CPT

## 2018-08-16 PROCEDURE — 82306 VITAMIN D 25 HYDROXY: CPT

## 2018-08-16 PROCEDURE — 36415 COLL VENOUS BLD VENIPUNCTURE: CPT

## 2018-08-23 ENCOUNTER — OFFICE VISIT (OUTPATIENT)
Dept: MEDICAL GROUP | Facility: PHYSICIAN GROUP | Age: 58
End: 2018-08-23
Payer: COMMERCIAL

## 2018-08-23 VITALS
RESPIRATION RATE: 18 BRPM | SYSTOLIC BLOOD PRESSURE: 140 MMHG | HEIGHT: 68 IN | BODY MASS INDEX: 23.04 KG/M2 | WEIGHT: 152 LBS | TEMPERATURE: 98.7 F | OXYGEN SATURATION: 98 % | HEART RATE: 64 BPM | DIASTOLIC BLOOD PRESSURE: 80 MMHG

## 2018-08-23 DIAGNOSIS — F41.9 ANXIETY: ICD-10-CM

## 2018-08-23 DIAGNOSIS — N95.1 MENOPAUSE SYNDROME: ICD-10-CM

## 2018-08-23 DIAGNOSIS — E55.9 VITAMIN D DEFICIENCY: ICD-10-CM

## 2018-08-23 DIAGNOSIS — I10 ESSENTIAL HYPERTENSION: ICD-10-CM

## 2018-08-23 DIAGNOSIS — E78.5 HYPERLIPIDEMIA, UNSPECIFIED HYPERLIPIDEMIA TYPE: ICD-10-CM

## 2018-08-23 DIAGNOSIS — I47.10 PSVT (PAROXYSMAL SUPRAVENTRICULAR TACHYCARDIA): ICD-10-CM

## 2018-08-23 PROCEDURE — 99214 OFFICE O/P EST MOD 30 MIN: CPT | Performed by: INTERNAL MEDICINE

## 2018-08-23 RX ORDER — METOPROLOL SUCCINATE 25 MG/1
25 TABLET, EXTENDED RELEASE ORAL DAILY
Qty: 90 TAB | Refills: 3 | Status: SHIPPED | OUTPATIENT
Start: 2018-08-23 | End: 2019-02-01 | Stop reason: SDUPTHER

## 2018-08-23 RX ORDER — LISINOPRIL 10 MG/1
10 TABLET ORAL DAILY
Qty: 90 TAB | Refills: 3 | Status: SHIPPED | OUTPATIENT
Start: 2018-08-23 | End: 2019-02-01 | Stop reason: SDUPTHER

## 2018-08-23 ASSESSMENT — PATIENT HEALTH QUESTIONNAIRE - PHQ9: CLINICAL INTERPRETATION OF PHQ2 SCORE: 0

## 2018-08-23 NOTE — PROGRESS NOTES
Chief Complaint   Patient presents with   • Hypertension     Lab Results        HISTORY OF PRESENT ILLNESS: Patient is a 57 y.o. female established patient who presents today to discuss the medical issues below.    Hypertension  Patient is currently taking the metoprolol 25 mg 2 tabs bid, stopped the lisinopril.  135-145/82 range.  Weight is down.     Anxiety  Patient reports she is doing very well.  She has not utilized any Ambien or hydroxyzine over the last year.    PSVT (paroxysmal supraventricular tachycardia) (CMS-HCC)  Patient denies any chest pain palpitations.  Patient had increase the Exar metoprolol in an attempt to go to single medications on the blood pressure however blood pressure as discussed.      Patient Active Problem List    Diagnosis Date Noted   • Fasting hyperglycemia 11/29/2017   • Anxiety 11/06/2015   • Menopause syndrome 03/01/2012   • Hypertension    • Cervicalgia    • PSVT (paroxysmal supraventricular tachycardia) (CMS-[facility])        Allergies:Patient has no known allergies.    Current Outpatient Prescriptions   Medication Sig Dispense Refill   • lisinopril (PRINIVIL) 10 MG Tab Take 1 Tab by mouth every day. 90 Tab 3   • metoprolol SR (TOPROL XL) 25 MG TABLET SR 24 HR Take 1 Tab by mouth every day. 90 Tab 3   • Multiple Vitamin (MULTI-VITAMIN PO) Take  by mouth.     • Calcium 600 MG TABS Take 1,200 mg by mouth every day.     • estradiol (ESTRACE) 0.5 MG tablet Take 1 tablet by mouth  every day 90 Tab 0     No current facility-administered medications for this visit.          Past Medical History:   Diagnosis Date   • Anxiety 11/6/2015   • Cervicalgia    • Essential hypertension, malignant    • FH: colon cancer     maternal, typically diagnosed in 50's   • FH: stroke     paternal    • Generalized anxiety disorder    • Hypertension    • Menopause syndrome 3/1/2012   • Pap smear 03/13/2007   • Premenstrual tension syndromes    • S/P VH (vaginal hysterectomy) 07/2007    secondary  to  "menorrhagia without BSO   • Screening mammogram 02/26/2008   • Shortness of breath    • Tachycardia, unspecified    • Ultrasound scan abnormal 3/15/2007    Pelvic, enlarged uterus with miltiple masses in the uterine wall consisten with leiomyas       Social History   Substance Use Topics   • Smoking status: Never Smoker   • Smokeless tobacco: Never Used   • Alcohol use 1.2 oz/week     2 Standard drinks or equivalent per week      Comment: rare       Family Status   Relation Status   • Mo Alive   • Fa Alive        overweight     Family History   Problem Relation Age of Onset   • Hypertension Mother    • Arthritis Mother         severe osteoarthritis   • Hypertension Father        ROS:    Respiratory: Negative for cough, sputum production, shortness of breath or wheezing.    Cardiovascular: Negative for chest pain, palpitations, orthopnea, dyspnea with exertion or edema.   Gastrointestinal: Negative for GI upset, nausea, vomiting, abdominal pain, constipation or diarrhea.   Genitourinary: Negative for dysuria, urgency, hesitancy or frequency.       Exam:    Blood pressure 140/80, pulse 64, temperature 37.1 °C (98.7 °F), resp. rate 18, height 1.715 m (5' 7.5\"), weight 68.9 kg (152 lb), SpO2 98 %.  General:  Well nourished, well developed female in NAD.  HENT: Normocephalic, bilateral TMs are intact, nasal and oral mucosa with no lesions,   Neck: Supple without bruit. Thyroid is not enlarged.  Pulmonary: Clear to ausculation and percussion.  Normal effort. No rales, rhonchi, or wheezing.  Cardiovascular: Regular rate and rhythm without murmur.   Abdomen: Normal bowel sounds soft and nontender no palpable liver spleen bladder mass.  Extremities: No LE edema noted.  Neuro: Grossly nonfocal.  Psych: Alert and oriented to person, place, and time. Appropriate mood and conversation.    LABS: Results reviewed and discussed with the patient, questions answered.      This dictation was created using voice recognition software. I " have made reasonable attempts to correct errors, however, errors of grammar and content may exist.          Assessment/Plan:    1. Essential hypertension  Blood pressure remaining somewhat borderline.  She had excellent control with lisinopril 10 mg a day and metoprolol 25 Exar daily.  We will go back to that regime and ongoing monitoring.  - lisinopril (PRINIVIL) 10 MG Tab; Take 1 Tab by mouth every day.  Dispense: 90 Tab; Refill: 3  - metoprolol SR (TOPROL XL) 25 MG TABLET SR 24 HR; Take 1 Tab by mouth every day.  Dispense: 90 Tab; Refill: 3    2. Anxiety  Stabilized with behavior modification support monitor    3. PSVT (paroxysmal supraventricular tachycardia) (CMS-[facility])  No recurrence stable on beta-blockade not following regularly with cardiology.  Support for now.    4 .Menopausal syndrome  Currently not taking estrogen doing well.    5.  Vitamin D deficiency  Reviewed levels recommend supplementation lab monitoring.    Patient was seen for 25 minutes face to face of which more than 50% of the time was spent in counseling and coordination of care regarding the above problems.

## 2018-08-23 NOTE — ASSESSMENT & PLAN NOTE
Patient denies any chest pain palpitations.  Patient had increase the Exar metoprolol in an attempt to go to single medications on the blood pressure however blood pressure as discussed.

## 2018-08-23 NOTE — ASSESSMENT & PLAN NOTE
Patient is currently taking the metoprolol 25 mg 2 tabs bid, stopped the lisinopril.  135-145/82 range.  Weight is down.

## 2018-08-23 NOTE — ASSESSMENT & PLAN NOTE
Patient reports she is doing very well.  She has not utilized any Ambien or hydroxyzine over the last year.

## 2019-02-01 ENCOUNTER — OFFICE VISIT (OUTPATIENT)
Dept: MEDICAL GROUP | Facility: PHYSICIAN GROUP | Age: 59
End: 2019-02-01
Payer: COMMERCIAL

## 2019-02-01 VITALS
DIASTOLIC BLOOD PRESSURE: 88 MMHG | HEIGHT: 68 IN | SYSTOLIC BLOOD PRESSURE: 140 MMHG | RESPIRATION RATE: 14 BRPM | TEMPERATURE: 98.8 F | HEART RATE: 69 BPM | OXYGEN SATURATION: 96 % | WEIGHT: 158 LBS | BODY MASS INDEX: 23.95 KG/M2

## 2019-02-01 DIAGNOSIS — F41.9 ANXIETY: ICD-10-CM

## 2019-02-01 DIAGNOSIS — M54.2 CERVICALGIA: ICD-10-CM

## 2019-02-01 DIAGNOSIS — I10 ESSENTIAL HYPERTENSION: ICD-10-CM

## 2019-02-01 PROCEDURE — 99214 OFFICE O/P EST MOD 30 MIN: CPT | Performed by: INTERNAL MEDICINE

## 2019-02-01 RX ORDER — DICLOFENAC SODIUM 75 MG/1
75 TABLET, DELAYED RELEASE ORAL 2 TIMES DAILY
Qty: 60 TAB | Refills: 3 | Status: SHIPPED | OUTPATIENT
Start: 2019-02-01 | End: 2019-11-18

## 2019-02-01 RX ORDER — METOPROLOL SUCCINATE 25 MG/1
25 TABLET, EXTENDED RELEASE ORAL DAILY
Qty: 180 TAB | Refills: 3 | Status: SHIPPED | OUTPATIENT
Start: 2019-02-01 | End: 2019-05-09

## 2019-02-01 RX ORDER — LISINOPRIL 10 MG/1
10 TABLET ORAL DAILY
Qty: 180 TAB | Refills: 3 | Status: SHIPPED | OUTPATIENT
Start: 2019-02-01 | End: 2019-02-25 | Stop reason: SDUPTHER

## 2019-02-01 ASSESSMENT — PATIENT HEALTH QUESTIONNAIRE - PHQ9: CLINICAL INTERPRETATION OF PHQ2 SCORE: 0

## 2019-02-01 NOTE — ASSESSMENT & PLAN NOTE
patinet with intermittent neck aching, more recently with sewing x 6 weeks, no numbness tingling ore weakness, ibuprofen helps but not resolved. No trauma.  She has not tried chiropractic recently, has had improvement in the past.

## 2019-02-01 NOTE — PROGRESS NOTES
Chief Complaint   Patient presents with   • Hypertension     pt has been getting high BP readings       HISTORY OF PRESENT ILLNESS: Patient is a 58 y.o. female established patient who presents today to discuss the medical issues below.    Hypertension  Patient reports her bp seems to be higher.  Started in Dec with generally not feeling well, she has a sore neck, she has been sewing a lot.  She is taking OTC ibuprofen 2 tabs once in awhile q OD when pain is severe.  She continues to take the lisinopril 10 mg and 25 mg metoprolol she has increased to bid and feels a bit better.  She started the double dose last week.  Weight is up. She reports she started walking again about 2 weeks.      Anxiety  Denies any significant stressors.      Cervicalgia  patinet with intermittent neck aching, more recently with sewing x 6 weeks, no numbness tingling ore weakness, ibuprofen helps but not resolved. No trauma.  She has not tried chiropractic recently, has had improvement in the past.        Patient Active Problem List    Diagnosis Date Noted   • Fasting hyperglycemia 11/29/2017   • Anxiety 11/06/2015   • Menopause syndrome 03/01/2012   • Hypertension    • Cervicalgia    • PSVT (paroxysmal supraventricular tachycardia) (CMS-[facility])        Allergies:Patient has no known allergies.    Current Outpatient Prescriptions   Medication Sig Dispense Refill   • lisinopril (PRINIVIL) 10 MG Tab Take 1 Tab by mouth every day. 180 Tab 3   • metoprolol SR (TOPROL XL) 25 MG TABLET SR 24 HR Take 1 Tab by mouth every day. 180 Tab 3   • diclofenac EC (VOLTAREN) 75 MG Tablet Delayed Response Take 1 Tab by mouth 2 times a day. 60 Tab 3   • Multiple Vitamin (MULTI-VITAMIN PO) Take  by mouth.     • Calcium 600 MG TABS Take 1,200 mg by mouth every day.       No current facility-administered medications for this visit.          Past Medical History:   Diagnosis Date   • Anxiety 11/6/2015   • Cervicalgia    • Essential hypertension, malignant    • FH:  "colon cancer     maternal, typically diagnosed in 50's   • FH: stroke     paternal    • Generalized anxiety disorder    • Hypertension    • Menopause syndrome 3/1/2012   • Pap smear 03/13/2007   • Premenstrual tension syndromes    • S/P VH (vaginal hysterectomy) 07/2007    secondary  to menorrhagia without BSO   • Screening mammogram 02/26/2008   • Shortness of breath    • Tachycardia, unspecified    • Ultrasound scan abnormal 3/15/2007    Pelvic, enlarged uterus with miltiple masses in the uterine wall consisten with leiomyas       Social History   Substance Use Topics   • Smoking status: Never Smoker   • Smokeless tobacco: Never Used   • Alcohol use 1.2 oz/week     2 Standard drinks or equivalent per week      Comment: rare       Family Status   Relation Status   • Mo Alive   • Fa Alive        overweight     Family History   Problem Relation Age of Onset   • Hypertension Mother    • Arthritis Mother         severe osteoarthritis   • Hypertension Father        ROS:    Respiratory: Negative for cough, sputum production, shortness of breath or wheezing.    Cardiovascular: Negative for chest pain, palpitations, orthopnea, dyspnea with exertion or edema.   Gastrointestinal: Negative for GI upset, nausea, vomiting, abdominal pain, constipation or diarrhea.   Genitourinary: Negative for dysuria, urgency, hesitancy or frequency.       Exam:  Blood pressure 140/88, pulse 69, temperature 37.1 °C (98.8 °F), temperature source Temporal, resp. rate 14, height 1.715 m (5' 7.5\"), weight 71.7 kg (158 lb), SpO2 96 %, not currently breastfeeding.  General:  Well nourished, well developed female in NAD.  Neck: Supple without bruit. Thyroid is not enlarged. Diffuse bilateral muscular tenderness  Pulmonary: Clear to ausculation and percussion.  Normal effort. No rales, rhonchi, or wheezing.  Cardiovascular: Regular rate and rhythm without murmur.   Abdomen: Normal bowel sounds soft and nontender no palpable liver spleen bladder " mass.  Extremities: No LE edema noted.  Neuro: Grossly nonfocal.  Psych: Alert and oriented to person, place, and time. Appropriate mood and conversation.        This dictation was created using voice recognition software. I have made reasonable attempts to correct errors, however, errors of grammar and content may exist.          Assessment/Plan:    1. Essential hypertension  Discussed low salt, exercise, manage neck pain.  Check home monitor, increase dose of meds, monitor for potential additional need meds.    - lisinopril (PRINIVIL) 10 MG Tab; Take 1 Tab by mouth every day.  Dispense: 180 Tab; Refill: 3  - metoprolol SR (TOPROL XL) 25 MG TABLET SR 24 HR; Take 1 Tab by mouth every day.  Dispense: 180 Tab; Refill: 3    2. Anxiety  Stable, unlikely to contribute to the bp issues    3. Cervicalgia  Discussed nsaids, trial diclofenac, heat stretching, consider chiropractic, referral and work up if persisting.      Patient was seen for 25 minutes face to face of which more than 50% of the time was spent in counseling and coordination of care regarding the above problems.

## 2019-02-01 NOTE — ASSESSMENT & PLAN NOTE
Patient reports her bp seems to be higher.  Started in Dec with generally not feeling well, she has a sore neck, she has been sewing a lot.  She is taking OTC ibuprofen 2 tabs once in awhile q OD when pain is severe.  She continues to take the lisinopril 10 mg and 25 mg metoprolol she has increased to bid and feels a bit better.  She started the double dose last week.  Weight is up. She reports she started walking again about 2 weeks.

## 2019-02-13 ENCOUNTER — NON-PROVIDER VISIT (OUTPATIENT)
Dept: MEDICAL GROUP | Facility: PHYSICIAN GROUP | Age: 59
End: 2019-02-13
Payer: COMMERCIAL

## 2019-02-13 ENCOUNTER — TELEPHONE (OUTPATIENT)
Dept: MEDICAL GROUP | Facility: PHYSICIAN GROUP | Age: 59
End: 2019-02-13

## 2019-02-13 VITALS — SYSTOLIC BLOOD PRESSURE: 164 MMHG | DIASTOLIC BLOOD PRESSURE: 98 MMHG

## 2019-02-13 DIAGNOSIS — Z01.30 BLOOD PRESSURE CHECK: ICD-10-CM

## 2019-02-13 NOTE — TELEPHONE ENCOUNTER
Pt came in for BP check today.  I first took her BP with her machine from home and the machine read 164/98.  Then I took her BP manually and BP was 156/92.  I had her sit for 5 mins and BP was 150/90.  Pt increased her lisinopril and her metoprolol to 2 pills a day. She takes one of each in the morning and 1 of each at night. She said that she feels good but she is concerend with her high BP readings.  Please advise

## 2019-02-13 NOTE — NON-PROVIDER
Fatoumata Gutiérrez is a 58 y.o. female here for a non-provider visit for BP check    Vitals:    02/13/19 1045 02/13/19 1046 02/13/19 1047   BP: 156/92 150/90 (!) 164/98   BP Location: Left arm Left arm Left arm   Patient Position: Sitting Sitting Sitting   BP Cuff Size: Small adult Small adult      If abnormal, was an in office provider notified today? Yes  Routed to PCP? Yes

## 2019-02-14 RX ORDER — METOPROLOL SUCCINATE 50 MG/1
50 TABLET, EXTENDED RELEASE ORAL DAILY
Qty: 60 TAB | Refills: 3 | Status: SHIPPED | OUTPATIENT
Start: 2019-02-14 | End: 2019-09-09 | Stop reason: SDUPTHER

## 2019-02-14 NOTE — TELEPHONE ENCOUNTER
Ok, I would recommend we continue the lisinopril 10 mg bid and increase the metoprolol to 50 mg bid.  I have written a new Rx on the metoprolol, she can take two of the 25 mg tabs bid until she runs out.  Continue bp monitoring.

## 2019-02-25 DIAGNOSIS — I10 ESSENTIAL HYPERTENSION: ICD-10-CM

## 2019-02-25 NOTE — TELEPHONE ENCOUNTER
Was the patient seen in the last year in this department? Yes    Does patient have an active prescription for medications requested? Yes    Received Request Via: Patient         Pt is requesting to have the RX re-written for 20 mg once a day instead of the current RX for 10 mg once a day.    Please call if there are any questions or concerns.

## 2019-02-26 RX ORDER — LISINOPRIL 20 MG/1
20 TABLET ORAL 2 TIMES DAILY
Qty: 90 TAB | Refills: 0 | Status: SHIPPED | OUTPATIENT
Start: 2019-02-26 | End: 2019-04-09 | Stop reason: SDUPTHER

## 2019-03-21 ENCOUNTER — OFFICE VISIT (OUTPATIENT)
Dept: MEDICAL GROUP | Facility: PHYSICIAN GROUP | Age: 59
End: 2019-03-21
Payer: COMMERCIAL

## 2019-03-21 VITALS
TEMPERATURE: 98.4 F | BODY MASS INDEX: 23.23 KG/M2 | HEART RATE: 68 BPM | WEIGHT: 148 LBS | DIASTOLIC BLOOD PRESSURE: 84 MMHG | SYSTOLIC BLOOD PRESSURE: 140 MMHG | OXYGEN SATURATION: 88 % | RESPIRATION RATE: 12 BRPM | HEIGHT: 67 IN

## 2019-03-21 DIAGNOSIS — N30.00 ACUTE CYSTITIS WITHOUT HEMATURIA: ICD-10-CM

## 2019-03-21 LAB
APPEARANCE UR: CLEAR
BILIRUB UR STRIP-MCNC: NORMAL MG/DL
COLOR UR AUTO: YELLOW
GLUCOSE UR STRIP.AUTO-MCNC: NORMAL MG/DL
KETONES UR STRIP.AUTO-MCNC: NORMAL MG/DL
LEUKOCYTE ESTERASE UR QL STRIP.AUTO: NORMAL
NITRITE UR QL STRIP.AUTO: NORMAL
PH UR STRIP.AUTO: 6.5 [PH] (ref 5–8)
PROT UR QL STRIP: NORMAL MG/DL
RBC UR QL AUTO: NORMAL
SP GR UR STRIP.AUTO: 1.01
UROBILINOGEN UR STRIP-MCNC: 0.2 MG/DL

## 2019-03-21 PROCEDURE — 81002 URINALYSIS NONAUTO W/O SCOPE: CPT | Performed by: NURSE PRACTITIONER

## 2019-03-21 PROCEDURE — 99214 OFFICE O/P EST MOD 30 MIN: CPT | Performed by: NURSE PRACTITIONER

## 2019-03-21 RX ORDER — CIPROFLOXACIN 500 MG/1
500 TABLET, FILM COATED ORAL 2 TIMES DAILY
Qty: 6 TAB | Refills: 0 | Status: SHIPPED | OUTPATIENT
Start: 2019-03-21 | End: 2019-03-24

## 2019-03-21 NOTE — PROGRESS NOTES
[facility]  Primary Care Office Visit - Problem-Oriented        History:     Fatoumata Gutiérrez is a 58 y.o. female who is here today to discuss Dysuria (x2days frequesnt uination)      Acute cystitis without hematuria  .. Patient is a 4-year-old female with dysuria, frequency of urine, urgency.  She does not have hematuria, low back pain, nausea, vomiting, fevers.  She has had a UTI in the past, symptoms today are similar to previous episodes.  Her last UTI was about a year ago.        Past Medical History:   Diagnosis Date   • Anxiety 11/6/2015   • Cervicalgia    • Essential hypertension, malignant    • FH: colon cancer     maternal, typically diagnosed in 50's   • FH: stroke     paternal    • Generalized anxiety disorder    • Hypertension    • Menopause syndrome 3/1/2012   • Pap smear 03/13/2007   • Premenstrual tension syndromes    • S/P VH (vaginal hysterectomy) 07/2007    secondary  to menorrhagia without BSO   • Screening mammogram 02/26/2008   • Shortness of breath    • Tachycardia, unspecified    • Ultrasound scan abnormal 3/15/2007    Pelvic, enlarged uterus with miltiple masses in the uterine wall consisten with leiomyas     Past Surgical History:   Procedure Laterality Date   • HYSTERECTOMY, VAGINAL      without BSO due to fibroids     Social History     Social History   • Marital status:      Spouse name: N/A   • Number of children: N/A   • Years of education: N/A     Occupational History   • Not on file.     Social History Main Topics   • Smoking status: Never Smoker   • Smokeless tobacco: Never Used   • Alcohol use 1.2 oz/week     2 Standard drinks or equivalent per week      Comment: rare   • Drug use: No   • Sexual activity: Yes     Partners: Male     Birth control/ protection: Surgical      Comment: ,      Other Topics Concern   •  Service No   • Blood Transfusions No   • Caffeine Concern No   • Occupational Exposure No   • Hobby Hazards No   • Sleep  "Concern No   • Stress Concern No   • Weight Concern No   • Special Diet No   • Back Care No   • Exercise Yes     walking 30 minutes daily   • Bike Helmet No   • Seat Belt Yes   • Self-Exams Yes     Social History Narrative    Teacher,      History   Smoking Status   • Never Smoker   Smokeless Tobacco   • Never Used     Family History   Problem Relation Age of Onset   • Hypertension Mother    • Arthritis Mother         severe osteoarthritis   • Hypertension Father      No Known Allergies    Problem List:     Patient Active Problem List    Diagnosis Date Noted   • Acute cystitis without hematuria 03/21/2019   • Fasting hyperglycemia 11/29/2017   • Anxiety 11/06/2015   • Menopause syndrome 03/01/2012   • Hypertension    • Cervicalgia    • PSVT (paroxysmal supraventricular tachycardia) (CMS-[facility])          Medications:     Current Outpatient Prescriptions:   •  ciprofloxacin (CIPRO) 500 MG Tab, Take 1 Tab by mouth 2 times a day for 3 days., Disp: 6 Tab, Rfl: 0  •  lisinopril (PRINIVIL) 20 MG Tab, Take 1 Tab by mouth 2 times a day., Disp: 90 Tab, Rfl: 0  •  metoprolol SR (TOPROL XL) 50 MG TABLET SR 24 HR, Take 1 Tab by mouth every day., Disp: 60 Tab, Rfl: 3  •  Multiple Vitamin (MULTI-VITAMIN PO), Take  by mouth., Disp: , Rfl:   •  Calcium 600 MG TABS, Take 1,200 mg by mouth every day., Disp: , Rfl:   •  metoprolol SR (TOPROL XL) 25 MG TABLET SR 24 HR, Take 1 Tab by mouth every day., Disp: 180 Tab, Rfl: 3  •  diclofenac EC (VOLTAREN) 75 MG Tablet Delayed Response, Take 1 Tab by mouth 2 times a day., Disp: 60 Tab, Rfl: 3      Review of Systems:     Pertinent positives as per HPI, all other systems reviewed and WNL     Physical Assessment:     VS: /84 (BP Location: Left arm, Patient Position: Sitting, BP Cuff Size: Adult)   Pulse 68   Temp 36.9 °C (98.4 °F)   Resp 12   Ht 1.702 m (5' 7\")   Wt 67.1 kg (148 lb)   SpO2 88%   BMI 23.18 kg/m²     General: Well-developed, well-nourished, female     Head: PERRL, " EOMI. Normocephalic. No facial asymmetry noted.  Cardiovasc: RRR, no MRG. No thrills or bruits. Pulses 2+ and symmetric at all distal extremities.  Pulmonary: Lungs clear bilaterally.  Normal respiratory effort. No wheeze or crackles.   Neuro: Alert and oriented  Skin:No rashes noted. Skin warm, dry, intact    Psych: Dressed appropriately for the weather, pleasant and conversant.  Affect, mood & judgment appropriate.      Assessment/Plan:   Fatoumata ALBERTO was seen today for dysuria.    Diagnoses and all orders for this visit:    Acute cystitis without hematuria, new   - push fluids, may use OTC azo, tx with cipro x 3 days. Reevaluate if sx are not resolved with tx.   -     ciprofloxacin (CIPRO) 500 MG Tab; Take 1 Tab by mouth 2 times a day for 3 days.      Patient is agreeable to the above plan and voiced understanding. All questions answered.     Please note that this dictation was created using voice recognition software. I have made every reasonable attempt to correct obvious errors, but I expect that there are errors of grammar and possibly content that I did not discover before finalizing the note.      SHELLEY Guzman  3/21/2019, 1:29 PM

## 2019-03-21 NOTE — ASSESSMENT & PLAN NOTE
.. Patient is a 4-year-old female with dysuria, frequency of urine, urgency.  She does not have hematuria, low back pain, nausea, vomiting, fevers.  She has had a UTI in the past, symptoms today are similar to previous episodes.  Her last UTI was about a year ago.

## 2019-04-09 DIAGNOSIS — I10 ESSENTIAL HYPERTENSION: ICD-10-CM

## 2019-04-10 NOTE — TELEPHONE ENCOUNTER
Was the patient seen in the last year in this department? Yes    Does patient have an active prescription for medications requested? No     Received Request Via: Pharmacy      Pt met protocol?: Yes    OV 3/19    BP Readings from Last 1 Encounters:   03/21/19 140/84

## 2019-04-11 RX ORDER — LISINOPRIL 20 MG/1
TABLET ORAL
Qty: 180 TAB | Refills: 0 | Status: SHIPPED | OUTPATIENT
Start: 2019-04-11 | End: 2019-07-18 | Stop reason: SDUPTHER

## 2019-05-08 ENCOUNTER — HOSPITAL ENCOUNTER (OUTPATIENT)
Dept: LAB | Facility: MEDICAL CENTER | Age: 59
End: 2019-05-08
Attending: INTERNAL MEDICINE
Payer: COMMERCIAL

## 2019-05-08 DIAGNOSIS — E78.5 HYPERLIPIDEMIA, UNSPECIFIED HYPERLIPIDEMIA TYPE: ICD-10-CM

## 2019-05-08 DIAGNOSIS — I10 ESSENTIAL HYPERTENSION: ICD-10-CM

## 2019-05-08 DIAGNOSIS — E55.9 VITAMIN D DEFICIENCY: ICD-10-CM

## 2019-05-08 LAB
25(OH)D3 SERPL-MCNC: 27 NG/ML (ref 30–100)
ALBUMIN SERPL BCP-MCNC: 3.9 G/DL (ref 3.2–4.9)
ALBUMIN/GLOB SERPL: 1.3 G/DL
ALP SERPL-CCNC: 77 U/L (ref 30–99)
ALT SERPL-CCNC: 23 U/L (ref 2–50)
ANION GAP SERPL CALC-SCNC: 6 MMOL/L (ref 0–11.9)
AST SERPL-CCNC: 21 U/L (ref 12–45)
BASOPHILS # BLD AUTO: 1.2 % (ref 0–1.8)
BASOPHILS # BLD: 0.05 K/UL (ref 0–0.12)
BILIRUB SERPL-MCNC: 0.8 MG/DL (ref 0.1–1.5)
BUN SERPL-MCNC: 17 MG/DL (ref 8–22)
CALCIUM SERPL-MCNC: 9.5 MG/DL (ref 8.5–10.5)
CHLORIDE SERPL-SCNC: 107 MMOL/L (ref 96–112)
CHOLEST SERPL-MCNC: 176 MG/DL (ref 100–199)
CO2 SERPL-SCNC: 29 MMOL/L (ref 20–33)
CREAT SERPL-MCNC: 0.82 MG/DL (ref 0.5–1.4)
EOSINOPHIL # BLD AUTO: 0.14 K/UL (ref 0–0.51)
EOSINOPHIL NFR BLD: 3.4 % (ref 0–6.9)
ERYTHROCYTE [DISTWIDTH] IN BLOOD BY AUTOMATED COUNT: 41.6 FL (ref 35.9–50)
GLOBULIN SER CALC-MCNC: 3 G/DL (ref 1.9–3.5)
GLUCOSE SERPL-MCNC: 108 MG/DL (ref 65–99)
HCT VFR BLD AUTO: 47.1 % (ref 37–47)
HDLC SERPL-MCNC: 64 MG/DL
HGB BLD-MCNC: 15.2 G/DL (ref 12–16)
IMM GRANULOCYTES # BLD AUTO: 0 K/UL (ref 0–0.11)
IMM GRANULOCYTES NFR BLD AUTO: 0 % (ref 0–0.9)
LDLC SERPL CALC-MCNC: 91 MG/DL
LYMPHOCYTES # BLD AUTO: 1.28 K/UL (ref 1–4.8)
LYMPHOCYTES NFR BLD: 31.2 % (ref 22–41)
MCH RBC QN AUTO: 29.6 PG (ref 27–33)
MCHC RBC AUTO-ENTMCNC: 32.3 G/DL (ref 33.6–35)
MCV RBC AUTO: 91.6 FL (ref 81.4–97.8)
MONOCYTES # BLD AUTO: 0.27 K/UL (ref 0–0.85)
MONOCYTES NFR BLD AUTO: 6.6 % (ref 0–13.4)
NEUTROPHILS # BLD AUTO: 2.36 K/UL (ref 2–7.15)
NEUTROPHILS NFR BLD: 57.6 % (ref 44–72)
NRBC # BLD AUTO: 0 K/UL
NRBC BLD-RTO: 0 /100 WBC
PLATELET # BLD AUTO: 261 K/UL (ref 164–446)
PMV BLD AUTO: 10.1 FL (ref 9–12.9)
POTASSIUM SERPL-SCNC: 5 MMOL/L (ref 3.6–5.5)
PROT SERPL-MCNC: 6.9 G/DL (ref 6–8.2)
RBC # BLD AUTO: 5.14 M/UL (ref 4.2–5.4)
SODIUM SERPL-SCNC: 142 MMOL/L (ref 135–145)
TRIGL SERPL-MCNC: 106 MG/DL (ref 0–149)
WBC # BLD AUTO: 4.1 K/UL (ref 4.8–10.8)

## 2019-05-08 PROCEDURE — 80053 COMPREHEN METABOLIC PANEL: CPT

## 2019-05-08 PROCEDURE — 36415 COLL VENOUS BLD VENIPUNCTURE: CPT

## 2019-05-08 PROCEDURE — 80061 LIPID PANEL: CPT

## 2019-05-08 PROCEDURE — 82306 VITAMIN D 25 HYDROXY: CPT

## 2019-05-08 PROCEDURE — 85025 COMPLETE CBC W/AUTO DIFF WBC: CPT

## 2019-05-09 ENCOUNTER — OFFICE VISIT (OUTPATIENT)
Dept: MEDICAL GROUP | Facility: PHYSICIAN GROUP | Age: 59
End: 2019-05-09
Payer: COMMERCIAL

## 2019-05-09 VITALS
HEIGHT: 68 IN | WEIGHT: 155 LBS | TEMPERATURE: 99.1 F | DIASTOLIC BLOOD PRESSURE: 88 MMHG | HEART RATE: 78 BPM | BODY MASS INDEX: 23.49 KG/M2 | SYSTOLIC BLOOD PRESSURE: 136 MMHG | RESPIRATION RATE: 14 BRPM | OXYGEN SATURATION: 96 %

## 2019-05-09 DIAGNOSIS — I10 ESSENTIAL HYPERTENSION: ICD-10-CM

## 2019-05-09 DIAGNOSIS — M72.2 PLANTAR FASCIITIS: ICD-10-CM

## 2019-05-09 DIAGNOSIS — E55.9 VITAMIN D DEFICIENCY: ICD-10-CM

## 2019-05-09 DIAGNOSIS — I47.10 PSVT (PAROXYSMAL SUPRAVENTRICULAR TACHYCARDIA): ICD-10-CM

## 2019-05-09 DIAGNOSIS — F41.9 ANXIETY: ICD-10-CM

## 2019-05-09 DIAGNOSIS — M54.2 CERVICALGIA: ICD-10-CM

## 2019-05-09 DIAGNOSIS — R73.01 FASTING HYPERGLYCEMIA: ICD-10-CM

## 2019-05-09 PROCEDURE — 99214 OFFICE O/P EST MOD 30 MIN: CPT | Performed by: INTERNAL MEDICINE

## 2019-05-09 RX ORDER — SODIUM PHOSPHATE,MONO-DIBASIC 19G-7G/118
500 ENEMA (ML) RECTAL
COMMUNITY

## 2019-05-09 NOTE — ASSESSMENT & PLAN NOTE
Patient working on low carbohydrate diet,  has been diagnosed as diabetes so they are trying to work on general diet as well.  Weight is up a bit.  She is trying to exercise regularly.  No family history of diabetes.

## 2019-05-09 NOTE — PROGRESS NOTES
Chief Complaint   Patient presents with   • Hypertension     Lab results    • Foot Pain     left foot pain x 5 weeks    • Ankle Pain     bilateral ankle pain x5 weeks        HISTORY OF PRESENT ILLNESS: Patient is a 58 y.o. female established patient who presents today to discuss the medical issues below.    Hypertension  Metoprolol at 50 mg 1 x a day in am, lisinopril 1 tab 20 mg bid, she has bp in the 135/70 range.  No chest pain palpitations or edema.    Plantar fasciitis  Patient reports tenderness of the plantar aspect of the right foot, she feels her ankles are weaker, she doesn't feel she had any trauma, she cannot explain the issue.  She did find the diclofenac helpful.  She still has diffuse aching at the bottom of her foot and some radiation into the medial aspect.  No weakness.    Fasting hyperglycemia  Patient working on low carbohydrate diet,  has been diagnosed as diabetes so they are trying to work on general diet as well.  Weight is up a bit.  She is trying to exercise regularly.  No family history of diabetes.    PSVT (paroxysmal supraventricular tachycardia) (CMS-HCC)  Has seen Dr. Jacobs at [place] no palpitations continues on metoprolol.  She is taking the 50 mg every morning    Cervicalgia  Patient is working with chiropractic.  Not a specific complaint today.  Not utilizing t the diclofenac on a regular basis.      Patient Active Problem List    Diagnosis Date Noted   • Plantar fasciitis 05/09/2019   • Acute cystitis without hematuria 03/21/2019   • Fasting hyperglycemia 11/29/2017   • Anxiety 11/06/2015   • Menopause syndrome 03/01/2012   • Hypertension    • Cervicalgia    • PSVT (paroxysmal supraventricular tachycardia) (CMS-HCC)        Allergies:Patient has no known allergies.    Current Outpatient Prescriptions   Medication Sig Dispense Refill   • COLLAGEN PO Take  by mouth.     • glucosamine Sulfate 500 MG Cap Take 500 mg by mouth 3 times a day, with meals.     • lisinopril  (PRINIVIL) 20 MG Tab TAKE 1 TABLET BY MOUTH TWICE DAILY 180 Tab 0   • metoprolol SR (TOPROL XL) 50 MG TABLET SR 24 HR Take 1 Tab by mouth every day. 60 Tab 3   • Multiple Vitamin (MULTI-VITAMIN PO) Take  by mouth.     • Calcium 600 MG TABS Take 1,200 mg by mouth every day.     • diclofenac EC (VOLTAREN) 75 MG Tablet Delayed Response Take 1 Tab by mouth 2 times a day. 60 Tab 3     No current facility-administered medications for this visit.          Past Medical History:   Diagnosis Date   • Anxiety 11/6/2015   • Cervicalgia    • Essential hypertension, malignant    • FH: colon cancer     maternal, typically diagnosed in 50's   • FH: stroke     paternal    • Generalized anxiety disorder    • Hypertension    • Menopause syndrome 3/1/2012   • Pap smear 03/13/2007   • Premenstrual tension syndromes    • S/P VH (vaginal hysterectomy) 07/2007    secondary  to menorrhagia without BSO   • Screening mammogram 02/26/2008   • Shortness of breath    • Tachycardia, unspecified    • Ultrasound scan abnormal 3/15/2007    Pelvic, enlarged uterus with miltiple masses in the uterine wall consisten with leiomyas       Social History   Substance Use Topics   • Smoking status: Never Smoker   • Smokeless tobacco: Never Used   • Alcohol use 1.2 oz/week     2 Standard drinks or equivalent per week      Comment: rare       Family Status   Relation Status   • Mo Alive   • Fa Alive        overweight     Family History   Problem Relation Age of Onset   • Hypertension Mother    • Arthritis Mother         severe osteoarthritis   • Hypertension Father        ROS:    Respiratory: Negative for cough, sputum production, shortness of breath or wheezing.    Cardiovascular: Negative for chest pain, palpitations, orthopnea, dyspnea with exertion or edema.   Gastrointestinal: Negative for GI upset, nausea, vomiting, abdominal pain, constipation or diarrhea.   Genitourinary: Negative for dysuria, urgency, hesitancy or frequency.       Exam:    /88  "(BP Location: Left arm, Patient Position: Sitting, BP Cuff Size: Small adult)   Pulse 78   Temp 37.3 °C (99.1 °F) (Temporal)   Resp 14   Ht 1.727 m (5' 8\")   Wt 70.3 kg (155 lb)   SpO2 96%   General:  Well nourished, well developed female in NAD.  Pulmonary: Clear to ausculation and percussion.  Normal effort. No rales, rhonchi, or wheezing.  Cardiovascular: Regular rate and rhythm without murmur.   Abdomen: Normal bowel sounds soft and nontender no palpable liver spleen bladder mass.  Extremities: No LE edema noted.  She does have point tenderness at the more medial aspect of the arch, not true plantar insertion site.  Some radiation up into the medial ankle.  No edema no range of motion abnormalities.  Neuro: Grossly nonfocal.  Psych: Alert and oriented to person, place, and time. Appropriate mood and conversation.    LABS: Results reviewed and discussed with the patient, questions answered.      This dictation was created using voice recognition software. I have made reasonable attempts to correct errors, however, errors of grammar and content may exist.          Assessment/Plan:    1. Essential hypertension  Blood pressure borderline here home readings are excellent no change to medications for now ongoing home monitoring discussed diet exercise weight loss  - Comp Metabolic Panel; Future  - CBC WITH DIFFERENTIAL; Future    2. Plantar fasciitis  A bit more medial on exam and true plantar fasciitis with some radiation of the ankle.  Discussed ongoing support shoe.  Use of anti-inflammatory until pain is resolved.  Offered referral to podiatry she wants to hold off for now.    3. Anxiety  Per patient doing very well on currently no medications not requiring sleep aid.    4. Vitamin D deficiency  Little bit low discussed doubling her supplementation ongoing lab monitoring  - VITAMIN D,25 HYDROXY; Future    5. Fasting hyperglycemia  Fasting glucose now in the 110 range.  Discussed implications diet exercise " low carbohydrate high-protein lab monitoring  - HEMOGLOBIN A1C; Future    6. PSVT (paroxysmal supraventricular tachycardia) (CMS-HCC)  Clinically stable with beta-blockade.    7. Cervicalgia  Following with chiropractic.  Monitor as needed diclofenac       Patient was seen for 25 minutes face to face of which more than 50% of the time was spent in counseling and coordination of care regarding the above problems.

## 2019-05-09 NOTE — ASSESSMENT & PLAN NOTE
Metoprolol at 50 mg 1 x a day in am, lisinopril 1 tab 20 mg bid, she has bp in the 135/70 range.  No chest pain palpitations or edema.

## 2019-05-09 NOTE — ASSESSMENT & PLAN NOTE
Has seen Dr. Jacobs at [facility] no palpitations continues on metoprolol.  She is taking the 50 mg every morning

## 2019-05-09 NOTE — ASSESSMENT & PLAN NOTE
Patient is working with chiropractic.  Not a specific complaint today.  Not utilizing t the diclofenac on a regular basis.

## 2019-05-24 ENCOUNTER — OFFICE VISIT (OUTPATIENT)
Dept: URGENT CARE | Facility: PHYSICIAN GROUP | Age: 59
End: 2019-05-24
Payer: COMMERCIAL

## 2019-05-24 ENCOUNTER — HOSPITAL ENCOUNTER (OUTPATIENT)
Facility: MEDICAL CENTER | Age: 59
End: 2019-05-24
Attending: PHYSICIAN ASSISTANT
Payer: COMMERCIAL

## 2019-05-24 VITALS
HEIGHT: 68 IN | TEMPERATURE: 97.2 F | WEIGHT: 155 LBS | BODY MASS INDEX: 23.49 KG/M2 | HEART RATE: 72 BPM | OXYGEN SATURATION: 96 % | SYSTOLIC BLOOD PRESSURE: 132 MMHG | DIASTOLIC BLOOD PRESSURE: 84 MMHG | RESPIRATION RATE: 14 BRPM

## 2019-05-24 DIAGNOSIS — N30.01 ACUTE CYSTITIS WITH HEMATURIA: ICD-10-CM

## 2019-05-24 PROCEDURE — 99214 OFFICE O/P EST MOD 30 MIN: CPT | Performed by: PHYSICIAN ASSISTANT

## 2019-05-24 PROCEDURE — 87086 URINE CULTURE/COLONY COUNT: CPT

## 2019-05-24 RX ORDER — NITROFURANTOIN 25; 75 MG/1; MG/1
100 CAPSULE ORAL EVERY 12 HOURS
Qty: 14 CAP | Refills: 0 | Status: SHIPPED | OUTPATIENT
Start: 2019-05-24 | End: 2019-05-31

## 2019-05-24 ASSESSMENT — ENCOUNTER SYMPTOMS
VOMITING: 0
SWEATS: 0
NAUSEA: 0
FLANK PAIN: 0
CHILLS: 0

## 2019-05-24 NOTE — PROGRESS NOTES
Subjective:   Fatoumata Gutiérrez is a 58 y.o. female who presents for UTI        Dysuria    This is a new problem. The current episode started today. The problem occurs every urination. The problem has been gradually worsening. The quality of the pain is described as burning. The pain is moderate. There has been no fever. There is a history of pyelonephritis. Associated symptoms include frequency, hematuria and urgency. Pertinent negatives include no chills, discharge, flank pain, hesitancy, nausea, possible pregnancy, sweats or vomiting. She has tried nothing for the symptoms. There is no history of kidney stones or recurrent UTIs.     Review of Systems   Constitutional: Negative for chills.   Gastrointestinal: Negative for nausea and vomiting.   Genitourinary: Positive for dysuria, frequency, hematuria and urgency. Negative for flank pain and hesitancy.       PMH:  has a past medical history of Anxiety (11/6/2015); Cervicalgia; Essential hypertension, malignant; FH: colon cancer; FH: stroke; Generalized anxiety disorder; Hypertension; Menopause syndrome (3/1/2012); Pap smear (03/13/2007); Premenstrual tension syndromes; S/P VH (vaginal hysterectomy) (07/2007); Screening mammogram (02/26/2008); Shortness of breath; Tachycardia, unspecified; and Ultrasound scan abnormal (3/15/2007).  MEDS:   Current Outpatient Prescriptions:   •  nitrofurantoin monohyd macro (MACROBID) 100 MG Cap, Take 1 Cap by mouth every 12 hours for 7 days., Disp: 14 Cap, Rfl: 0  •  COLLAGEN PO, Take  by mouth., Disp: , Rfl:   •  glucosamine Sulfate 500 MG Cap, Take 500 mg by mouth 3 times a day, with meals., Disp: , Rfl:   •  lisinopril (PRINIVIL) 20 MG Tab, TAKE 1 TABLET BY MOUTH TWICE DAILY, Disp: 180 Tab, Rfl: 0  •  metoprolol SR (TOPROL XL) 50 MG TABLET SR 24 HR, Take 1 Tab by mouth every day., Disp: 60 Tab, Rfl: 3  •  diclofenac EC (VOLTAREN) 75 MG Tablet Delayed Response, Take 1 Tab by mouth 2 times a day., Disp: 60 Tab, Rfl: 3  •  Multiple  "Vitamin (MULTI-VITAMIN PO), Take  by mouth., Disp: , Rfl:   •  Calcium 600 MG TABS, Take 1,200 mg by mouth every day., Disp: , Rfl:   ALLERGIES: No Known Allergies  SURGHX:   Past Surgical History:   Procedure Laterality Date   • HYSTERECTOMY, VAGINAL      without BSO due to fibroids     SOCHX:  reports that she has never smoked. She has never used smokeless tobacco. She reports that she drinks about 1.2 oz of alcohol per week . She reports that she does not use drugs.  FH: Family history was reviewed, no pertinent findings to report   Objective:   /84 (BP Location: Right arm, Patient Position: Sitting, BP Cuff Size: Adult)   Pulse 72   Temp 36.2 °C (97.2 °F) (Temporal)   Resp 14   Ht 1.727 m (5' 8\")   Wt 70.3 kg (155 lb)   SpO2 96%   BMI 23.57 kg/m²   Physical Exam   Constitutional: She appears well-developed and well-nourished.  Non-toxic appearance. No distress.   HENT:   Head: Normocephalic and atraumatic.   Right Ear: External ear normal.   Left Ear: External ear normal.   Nose: Nose normal.   Neck: Neck supple.   Pulmonary/Chest: Effort normal. No respiratory distress.   Abdominal: There is tenderness in the suprapubic area. There is no CVA tenderness.   Neurological: She is alert.   Skin: Skin is warm and dry. Capillary refill takes less than 2 seconds.   Psychiatric: She has a normal mood and affect. Her speech is normal and behavior is normal. Judgment and thought content normal. Cognition and memory are normal.   Vitals reviewed.        Assessment/Plan:   1. Acute cystitis with hematuria  - Urine Culture; Future  - nitrofurantoin monohyd macro (MACROBID) 100 MG Cap; Take 1 Cap by mouth every 12 hours for 7 days.  Dispense: 14 Cap; Refill: 0    UA and PE consistent with acute cystitis. Pyelonephritis unlikely as pt has no flank pain, CVA tenderness, N/V, F/C.     Finish entire course of antibiotics even if symptoms resolve earlier.  Take a probiotic or eat Juany’s yogurt or kefir while taking " the medication.  Drink 8, 8oz glasses of water every day.  If you have frequent UTIs or develop them after sexual intercourse try taking D-Mannose 1000mg, two or three times a day or for 3 days following intercourse.  If symptoms fail to improve in 48 hours, worsen or you develop fever, flank pain, nausea, vomiting, or other new symptoms return to the clinic immediately or go the ER.    Differential diagnosis, natural history, supportive care, and indications for immediate follow-up discussed.

## 2019-05-26 LAB
BACTERIA UR CULT: NORMAL
SIGNIFICANT IND 70042: NORMAL
SITE SITE: NORMAL
SOURCE SOURCE: NORMAL

## 2019-07-18 DIAGNOSIS — I10 ESSENTIAL HYPERTENSION: ICD-10-CM

## 2019-07-19 RX ORDER — LISINOPRIL 20 MG/1
TABLET ORAL
Qty: 180 TAB | Refills: 1 | Status: SHIPPED | OUTPATIENT
Start: 2019-07-19 | End: 2019-09-17 | Stop reason: SDUPTHER

## 2019-07-19 NOTE — TELEPHONE ENCOUNTER
Was the patient seen in the last year in this department? Yes    Does patient have an active prescription for medications requested? No     Received Request Via: Pharmacy      Pt met protocol?: Yes   Pt last ov 5/2019   BP Readings from Last 1 Encounters:   05/24/19 132/84

## 2019-07-19 NOTE — TELEPHONE ENCOUNTER
Refill X 6 months, sent to pharmacy.Pt. Seen in the last 6 months per protocol.   Lab Results   Component Value Date/Time    SODIUM 142 05/08/2019 07:32 AM    POTASSIUM 5.0 05/08/2019 07:32 AM    CHLORIDE 107 05/08/2019 07:32 AM    CO2 29 05/08/2019 07:32 AM    GLUCOSE 108 (H) 05/08/2019 07:32 AM    BUN 17 05/08/2019 07:32 AM    CREATININE 0.82 05/08/2019 07:32 AM    CREATININE 0.9 07/16/2007 12:55 PM

## 2019-07-30 ENCOUNTER — TELEPHONE (OUTPATIENT)
Dept: MEDICAL GROUP | Facility: PHYSICIAN GROUP | Age: 59
End: 2019-07-30

## 2019-07-30 DIAGNOSIS — Z12.39 SCREENING FOR BREAST CANCER: ICD-10-CM

## 2019-07-30 NOTE — TELEPHONE ENCOUNTER
1. Caller Name: Pt                      Call Back Number: [phone] (home)     2. Message: Pt called in requesting an order to have her yearly mammo done.    3. Patient approves office to leave a detailed voicemail/MyChart message: N\A

## 2019-08-21 ENCOUNTER — HOSPITAL ENCOUNTER (OUTPATIENT)
Dept: RADIOLOGY | Facility: MEDICAL CENTER | Age: 59
End: 2019-08-21

## 2019-09-04 ENCOUNTER — HOSPITAL ENCOUNTER (OUTPATIENT)
Dept: RADIOLOGY | Facility: MEDICAL CENTER | Age: 59
End: 2019-09-04
Attending: NURSE PRACTITIONER
Payer: COMMERCIAL

## 2019-09-04 DIAGNOSIS — Z12.39 SCREENING FOR BREAST CANCER: ICD-10-CM

## 2019-09-04 PROCEDURE — 77063 BREAST TOMOSYNTHESIS BI: CPT

## 2019-09-09 RX ORDER — METOPROLOL SUCCINATE 50 MG/1
50 TABLET, EXTENDED RELEASE ORAL DAILY
Qty: 90 TAB | Refills: 3 | Status: SHIPPED | OUTPATIENT
Start: 2019-09-09 | End: 2019-11-18

## 2019-09-09 NOTE — TELEPHONE ENCOUNTER
Phone Number Called: [phone]    Call outcome: left message for patient to call back regarding message below    Message: refill done

## 2019-09-17 DIAGNOSIS — I10 ESSENTIAL HYPERTENSION: ICD-10-CM

## 2019-09-17 RX ORDER — LISINOPRIL 20 MG/1
20 TABLET ORAL 2 TIMES DAILY
Qty: 180 TAB | Refills: 3 | Status: SHIPPED | OUTPATIENT
Start: 2019-09-17 | End: 2020-09-11 | Stop reason: SDUPTHER

## 2019-09-17 NOTE — TELEPHONE ENCOUNTER
Was the patient seen in the last year in this department? Yes    Does patient have an active prescription for medications requested? No     Received Request Via: Patient     Pt is switching this medication to [facility]

## 2019-11-18 ENCOUNTER — OFFICE VISIT (OUTPATIENT)
Dept: MEDICAL GROUP | Facility: PHYSICIAN GROUP | Age: 59
End: 2019-11-18
Payer: COMMERCIAL

## 2019-11-18 VITALS
DIASTOLIC BLOOD PRESSURE: 88 MMHG | RESPIRATION RATE: 16 BRPM | HEART RATE: 74 BPM | OXYGEN SATURATION: 97 % | SYSTOLIC BLOOD PRESSURE: 144 MMHG | TEMPERATURE: 96.9 F | BODY MASS INDEX: 23.48 KG/M2 | HEIGHT: 68 IN | WEIGHT: 154.9 LBS

## 2019-11-18 DIAGNOSIS — I10 ESSENTIAL HYPERTENSION: ICD-10-CM

## 2019-11-18 DIAGNOSIS — E55.9 VITAMIN D DEFICIENCY: ICD-10-CM

## 2019-11-18 DIAGNOSIS — I47.10 PSVT (PAROXYSMAL SUPRAVENTRICULAR TACHYCARDIA): ICD-10-CM

## 2019-11-18 PROCEDURE — 99213 OFFICE O/P EST LOW 20 MIN: CPT | Performed by: INTERNAL MEDICINE

## 2019-11-18 RX ORDER — METOPROLOL SUCCINATE 100 MG/1
100 TABLET, EXTENDED RELEASE ORAL DAILY
Qty: 90 TAB | Refills: 3 | Status: SHIPPED | OUTPATIENT
Start: 2019-11-18 | End: 2019-12-04 | Stop reason: SDUPTHER

## 2019-11-18 SDOH — HEALTH STABILITY: MENTAL HEALTH: HOW OFTEN DO YOU HAVE A DRINK CONTAINING ALCOHOL?: 2-3 TIMES A WEEK

## 2019-11-18 NOTE — PROGRESS NOTES
Chief Complaint   Patient presents with   • Follow-Up     Labs not done        HISTORY OF PRESENT ILLNESS: Patient is a 59 y.o. female established patient who presents today to discuss the medical issues below.    Hypertension  Patient reports she continues on the lisinopril.      PSVT (paroxysmal supraventricular tachycardia) (CMS-HCC)  Patient reports 2 episodes on her trip to [place], her bp at home 140/80 consuelo.  Patient reports the palpitations resolved with a glass of wine and sitting.  She has not seen Dr. Jacobs in quite a while.      Patient Active Problem List    Diagnosis Date Noted   • Plantar fasciitis 05/09/2019   • Acute cystitis without hematuria 03/21/2019   • Fasting hyperglycemia 11/29/2017   • Anxiety 11/06/2015   • Menopause syndrome 03/01/2012   • Hypertension    • Cervicalgia    • PSVT (paroxysmal supraventricular tachycardia) (CMS-HCC)        Allergies:Patient has no known allergies.    Current Outpatient Medications   Medication Sig Dispense Refill   • metoprolol SR (TOPROL XL) 100 MG TABLET SR 24 HR Take 1 Tab by mouth every day. 90 Tab 3   • lisinopril (PRINIVIL) 20 MG Tab Take 1 Tab by mouth 2 times a day. 180 Tab 3   • glucosamine Sulfate 500 MG Cap Take 500 mg by mouth 3 times a day, with meals.     • Multiple Vitamin (MULTI-VITAMIN PO) Take  by mouth.     • Calcium 600 MG TABS Take 1,200 mg by mouth every day.     • COLLAGEN PO Take  by mouth.     • diclofenac EC (VOLTAREN) 75 MG Tablet Delayed Response Take 1 Tab by mouth 2 times a day. (Patient not taking: Reported on 11/18/2019) 60 Tab 3     No current facility-administered medications for this visit.          Past Medical History:   Diagnosis Date   • Anxiety 11/6/2015   • Cervicalgia    • Essential hypertension, malignant    • FH: colon cancer     maternal, typically diagnosed in 50's   • FH: stroke     paternal    • Generalized anxiety disorder    • Hypertension    • Menopause syndrome 3/1/2012   • Pap smear 03/13/2007   •  "Premenstrual tension syndromes    • S/P VH (vaginal hysterectomy) 07/2007    secondary  to menorrhagia without BSO   • Screening mammogram 02/26/2008   • Shortness of breath    • Tachycardia, unspecified    • Ultrasound scan abnormal 3/15/2007    Pelvic, enlarged uterus with miltiple masses in the uterine wall consisten with leiomyas       Social History     Tobacco Use   • Smoking status: Never Smoker   • Smokeless tobacco: Never Used   Substance Use Topics   • Alcohol use: Yes     Alcohol/week: 1.2 oz     Types: 2 Standard drinks or equivalent per week     Frequency: 2-3 times a week     Comment: rare   • Drug use: No       Family Status   Relation Name Status   • Mo  Alive   • Fa  Alive        overweight     Family History   Problem Relation Age of Onset   • Hypertension Mother    • Arthritis Mother         severe osteoarthritis   • Hypertension Father        ROS:    Respiratory: Negative for cough, sputum production, shortness of breath or wheezing.    Cardiovascular: Negative for orthopnea, dyspnea with exertion or edema.   Gastrointestinal: Negative for GI upset, nausea, vomiting, abdominal pain, constipation or diarrhea.   Genitourinary: Negative for dysuria, urgency, hesitancy or frequency.       Exam:    /88   Pulse 74   Temp 36.1 °C (96.9 °F) (Temporal)   Resp 16   Ht 1.727 m (5' 8\")   Wt 70.3 kg (154 lb 14.4 oz)   SpO2 97%   General:  Well nourished, well developed female in NAD.  Pulmonary: Clear to ausculation and percussion.  Normal effort. No rales, rhonchi, or wheezing.  Cardiovascular: Regular rate and rhythm without murmur.   Abdomen: Normal bowel sounds soft and nontender no palpable liver spleen bladder mass.  Extremities: No LE edema noted.  Neuro: Grossly nonfocal.  Psych: Alert and oriented to person, place, and time. Appropriate mood and conversation.    Patient did not have lab work done.    This dictation was created using voice recognition software. I have made reasonable " attempts to correct errors, however, errors of grammar and content may exist.          Assessment/Plan:    1. Essential hypertension  Blood pressure is borderline.  Considering the breakthrough palpitations increase beta-blockade 100 mg ER daily.  Continuing on the lisinopril 20 mg twice daily.  Goal blood pressure discussed.    2. PSVT (paroxysmal supraventricular tachycardia) (CMS-[facility])  Increase in beta-blockade discussed with the patient.  Monitor.    3. Vit D def  On supplement due for labs.      Patient was seen for 20 minutes face to face of which more than 50% of the time was spent in counseling and coordination of care regarding the above problems.

## 2019-12-04 RX ORDER — METOPROLOL SUCCINATE 100 MG/1
100 TABLET, EXTENDED RELEASE ORAL DAILY
Qty: 90 TAB | Refills: 1 | Status: SHIPPED | OUTPATIENT
Start: 2019-12-04 | End: 2020-06-15

## 2019-12-04 NOTE — TELEPHONE ENCOUNTER
Was the patient seen in the last year in this department? Yes    Does patient have an active prescription for medications requested? No     Received Request Via: Patient     Last visit 11/18/19, next visit 2/18/20, last labs 5/8/19  [facility], thank you.

## 2020-02-10 ENCOUNTER — HOSPITAL ENCOUNTER (OUTPATIENT)
Dept: LAB | Facility: MEDICAL CENTER | Age: 60
End: 2020-02-10
Attending: INTERNAL MEDICINE
Payer: COMMERCIAL

## 2020-02-10 DIAGNOSIS — E55.9 VITAMIN D DEFICIENCY: ICD-10-CM

## 2020-02-10 DIAGNOSIS — I10 ESSENTIAL HYPERTENSION: ICD-10-CM

## 2020-02-10 DIAGNOSIS — R73.01 FASTING HYPERGLYCEMIA: ICD-10-CM

## 2020-02-10 LAB
25(OH)D3 SERPL-MCNC: 37 NG/ML (ref 30–100)
ALBUMIN SERPL BCP-MCNC: 4.2 G/DL (ref 3.2–4.9)
ALBUMIN/GLOB SERPL: 1.5 G/DL
ALP SERPL-CCNC: 89 U/L (ref 30–99)
ALT SERPL-CCNC: 43 U/L (ref 2–50)
ANION GAP SERPL CALC-SCNC: 8 MMOL/L (ref 0–11.9)
AST SERPL-CCNC: 33 U/L (ref 12–45)
BASOPHILS # BLD AUTO: 0.5 % (ref 0–1.8)
BASOPHILS # BLD: 0.03 K/UL (ref 0–0.12)
BILIRUB SERPL-MCNC: 0.8 MG/DL (ref 0.1–1.5)
BUN SERPL-MCNC: 26 MG/DL (ref 8–22)
CALCIUM SERPL-MCNC: 9.2 MG/DL (ref 8.5–10.5)
CHLORIDE SERPL-SCNC: 104 MMOL/L (ref 96–112)
CO2 SERPL-SCNC: 27 MMOL/L (ref 20–33)
CREAT SERPL-MCNC: 1.18 MG/DL (ref 0.5–1.4)
EOSINOPHIL # BLD AUTO: 0.13 K/UL (ref 0–0.51)
EOSINOPHIL NFR BLD: 2.3 % (ref 0–6.9)
ERYTHROCYTE [DISTWIDTH] IN BLOOD BY AUTOMATED COUNT: 40.5 FL (ref 35.9–50)
EST. AVERAGE GLUCOSE BLD GHB EST-MCNC: 120 MG/DL
FASTING STATUS PATIENT QL REPORTED: NORMAL
GLOBULIN SER CALC-MCNC: 2.8 G/DL (ref 1.9–3.5)
GLUCOSE SERPL-MCNC: 102 MG/DL (ref 65–99)
HBA1C MFR BLD: 5.8 % (ref 0–5.6)
HCT VFR BLD AUTO: 46.1 % (ref 37–47)
HGB BLD-MCNC: 15.2 G/DL (ref 12–16)
IMM GRANULOCYTES # BLD AUTO: 0.02 K/UL (ref 0–0.11)
IMM GRANULOCYTES NFR BLD AUTO: 0.4 % (ref 0–0.9)
LYMPHOCYTES # BLD AUTO: 1.49 K/UL (ref 1–4.8)
LYMPHOCYTES NFR BLD: 26.9 % (ref 22–41)
MCH RBC QN AUTO: 30.3 PG (ref 27–33)
MCHC RBC AUTO-ENTMCNC: 33 G/DL (ref 33.6–35)
MCV RBC AUTO: 91.8 FL (ref 81.4–97.8)
MONOCYTES # BLD AUTO: 0.38 K/UL (ref 0–0.85)
MONOCYTES NFR BLD AUTO: 6.9 % (ref 0–13.4)
NEUTROPHILS # BLD AUTO: 3.49 K/UL (ref 2–7.15)
NEUTROPHILS NFR BLD: 63 % (ref 44–72)
NRBC # BLD AUTO: 0 K/UL
NRBC BLD-RTO: 0 /100 WBC
PLATELET # BLD AUTO: 286 K/UL (ref 164–446)
PMV BLD AUTO: 10 FL (ref 9–12.9)
POTASSIUM SERPL-SCNC: 4.6 MMOL/L (ref 3.6–5.5)
PROT SERPL-MCNC: 7 G/DL (ref 6–8.2)
RBC # BLD AUTO: 5.02 M/UL (ref 4.2–5.4)
SODIUM SERPL-SCNC: 139 MMOL/L (ref 135–145)
WBC # BLD AUTO: 5.5 K/UL (ref 4.8–10.8)

## 2020-02-10 PROCEDURE — 83036 HEMOGLOBIN GLYCOSYLATED A1C: CPT

## 2020-02-10 PROCEDURE — 36415 COLL VENOUS BLD VENIPUNCTURE: CPT

## 2020-02-10 PROCEDURE — 82306 VITAMIN D 25 HYDROXY: CPT

## 2020-02-10 PROCEDURE — 80053 COMPREHEN METABOLIC PANEL: CPT

## 2020-02-10 PROCEDURE — 85025 COMPLETE CBC W/AUTO DIFF WBC: CPT

## 2020-02-18 ENCOUNTER — OFFICE VISIT (OUTPATIENT)
Dept: MEDICAL GROUP | Facility: PHYSICIAN GROUP | Age: 60
End: 2020-02-18
Payer: COMMERCIAL

## 2020-02-18 VITALS
SYSTOLIC BLOOD PRESSURE: 136 MMHG | DIASTOLIC BLOOD PRESSURE: 80 MMHG | OXYGEN SATURATION: 97 % | HEIGHT: 68 IN | TEMPERATURE: 98.1 F | BODY MASS INDEX: 24.28 KG/M2 | HEART RATE: 64 BPM | WEIGHT: 160.2 LBS

## 2020-02-18 DIAGNOSIS — Z12.11 COLON CANCER SCREENING: ICD-10-CM

## 2020-02-18 DIAGNOSIS — F41.9 ANXIETY: ICD-10-CM

## 2020-02-18 DIAGNOSIS — I47.10 PSVT (PAROXYSMAL SUPRAVENTRICULAR TACHYCARDIA) (HCC): ICD-10-CM

## 2020-02-18 DIAGNOSIS — R73.01 FASTING HYPERGLYCEMIA: ICD-10-CM

## 2020-02-18 DIAGNOSIS — E78.5 HYPERLIPIDEMIA, UNSPECIFIED HYPERLIPIDEMIA TYPE: ICD-10-CM

## 2020-02-18 DIAGNOSIS — I10 ESSENTIAL HYPERTENSION: ICD-10-CM

## 2020-02-18 PROCEDURE — 99214 OFFICE O/P EST MOD 30 MIN: CPT | Performed by: INTERNAL MEDICINE

## 2020-02-18 ASSESSMENT — PATIENT HEALTH QUESTIONNAIRE - PHQ9: CLINICAL INTERPRETATION OF PHQ2 SCORE: 0

## 2020-02-18 NOTE — ASSESSMENT & PLAN NOTE
Breakthrough occasionally.  Patient reports Dr. Brown has discussed ablation with her she is holding off on that.

## 2020-02-18 NOTE — ASSESSMENT & PLAN NOTE
Weight is up a bit she admits to not really following diet.  She did have lab work done.  No polydipsia polyuria.

## 2020-02-18 NOTE — PROGRESS NOTES
Chief Complaint   Patient presents with   • Follow-Up     HTN       HISTORY OF PRESENT ILLNESS: Patient is a 59 y.o. female established patient who presents today to discuss the medical issues below.    Hypertension  Generally home readings in the 130-140/ 80 range.  No chest pain or edema.  She does continue to have breakthrough palpitations.  This usually resolves if he sits and waits for a bit.  She had an episode fairly recently where she took another metoprolol and the symptoms resolved after about 20 minutes.  She reports she is very dyspneic with exertion however this means hiking [place].  She did not discuss this with cardiology although she did see Dr. Valladares just recently.    Anxiety  Patient states she is doing well, she does note more frequent SVT when she is anxious.    Fasting hyperglycemia  Weight is up a bit she admits to not really following diet.  She did have lab work done.  No polydipsia polyuria.    PSVT (paroxysmal supraventricular tachycardia) (CMS-HCC)  Breakthrough occasionally.  Patient reports Dr. Brown has discussed ablation with her she is holding off on that.      Patient Active Problem List    Diagnosis Date Noted   • Vitamin D deficiency 11/18/2019   • Plantar fasciitis 05/09/2019   • Acute cystitis without hematuria 03/21/2019   • Fasting hyperglycemia 11/29/2017   • Anxiety 11/06/2015   • Menopause syndrome 03/01/2012   • Hypertension    • Cervicalgia    • PSVT (paroxysmal supraventricular tachycardia) (CMS-HCC)        Allergies:Patient has no known allergies.    Current Outpatient Medications   Medication Sig Dispense Refill   • VITAMIN D PO Take  by mouth.     • metoprolol SR (TOPROL XL) 100 MG TABLET SR 24 HR Take 1 Tab by mouth every day. 90 Tab 1   • lisinopril (PRINIVIL) 20 MG Tab Take 1 Tab by mouth 2 times a day. 180 Tab 3   • glucosamine Sulfate 500 MG Cap Take 500 mg by mouth 3 times a day, with meals.     • Multiple Vitamin (MULTI-VITAMIN PO) Take  by mouth.    "  • Calcium 600 MG TABS Take 1,200 mg by mouth every day.       No current facility-administered medications for this visit.          Past Medical History:   Diagnosis Date   • Anxiety 11/6/2015   • Cervicalgia    • Essential hypertension, malignant    • FH: colon cancer     maternal, typically diagnosed in 50's   • FH: stroke     paternal    • Generalized anxiety disorder    • Hypertension    • Menopause syndrome 3/1/2012   • Pap smear 03/13/2007   • Premenstrual tension syndromes    • S/P VH (vaginal hysterectomy) 07/2007    secondary  to menorrhagia without BSO   • Screening mammogram 02/26/2008   • Shortness of breath    • Tachycardia, unspecified    • Ultrasound scan abnormal 3/15/2007    Pelvic, enlarged uterus with miltiple masses in the uterine wall consisten with leiomyas       Social History     Tobacco Use   • Smoking status: Never Smoker   • Smokeless tobacco: Never Used   Substance Use Topics   • Alcohol use: Yes     Alcohol/week: 1.2 oz     Types: 2 Standard drinks or equivalent per week     Frequency: 2-3 times a week     Comment: rare   • Drug use: No       Family Status   Relation Name Status   • Mo  Alive   • Fa  Alive        overweight     Family History   Problem Relation Age of Onset   • Hypertension Mother    • Arthritis Mother         severe osteoarthritis   • Hypertension Father        ROS:    Respiratory: Negative for cough, sputum production, shortness of breath or wheezing.    Cardiovascular: Negative for chest pain, palpitations, orthopnea, dyspnea with exertion or edema.   Gastrointestinal: Negative for GI upset, nausea, vomiting, abdominal pain, constipation or diarrhea.   Genitourinary: Negative for dysuria, urgency, hesitancy or frequency.       Exam:    /80 (BP Location: Right arm, Patient Position: Sitting)   Pulse 64   Temp 36.7 °C (98.1 °F) (Temporal)   Ht 1.727 m (5' 8\")   Wt 72.7 kg (160 lb 3.2 oz)   SpO2 97%  Body mass index is 24.36 kg/m².  General:  Well nourished, " well developed female in NAD.  Pulmonary: Clear to ausculation and percussion.  Normal effort. No rales, rhonchi, or wheezing.  Cardiovascular: Regular rate and rhythm without murmur.   Abdomen: Normal bowel sounds soft and nontender no palpable liver spleen bladder mass.  Extremities: No LE edema noted.  Neuro: Grossly nonfocal.  Psych: Alert and oriented to person, place, and time. Appropriate mood and conversation.    LABS: Results reviewed and discussed with the patient, questions answered.      This dictation was created using voice recognition software. I have made reasonable attempts to correct errors, however, errors of grammar and content may exist.          Assessment/Plan:    1. Essential hypertension  GFR slightly decreased.  Blood pressure fine here however she is having some borderline readings at home.  For now no medication changes.  Consider cardiology adjust.  Implications of weight discussed.  - Comp Metabolic Panel; Future    2. Hyperlipidemia, unspecified hyperlipidemia type  This was well controlled at last draw.  We did not do at this time we will add to the next lab test  - Lipid Profile; Future    3. Colon cancer screening  At 10-year screening recommendation  - REFERRAL TO GI FOR COLONOSCOPY    4. Anxiety  Continues with stress management clinically stable    5. Fasting hyperglycemia  A1c at 5.8 implications discussed no therapy, reviewed diet    6. PSVT (paroxysmal supraventricular tachycardia) (CMS-HCC)  Intermittent breakthrough.  Recommended she review this with cardiology considering the dyspnea which may be beta-blockade related, reviewed consideration for ablation    Patient was seen for 25 minutes face to face of which more than 50% of the time was spent in counseling and coordination of care regarding the above problems.

## 2020-02-18 NOTE — ASSESSMENT & PLAN NOTE
Generally home readings in the 130-140/ 80 range.  No chest pain or edema.  She does continue to have breakthrough palpitations.  This usually resolves if he sits and waits for a bit.  She had an episode fairly recently where she took another metoprolol and the symptoms resolved after about 20 minutes.  She reports she is very dyspneic with exertion however this means hiking [place].  She did not discuss this with cardiology although she did see Dr. Valladares just recently.

## 2020-03-18 ENCOUNTER — HOSPITAL ENCOUNTER (OUTPATIENT)
Facility: MEDICAL CENTER | Age: 60
End: 2020-03-18
Attending: PHYSICIAN ASSISTANT
Payer: COMMERCIAL

## 2020-03-18 ENCOUNTER — OFFICE VISIT (OUTPATIENT)
Dept: URGENT CARE | Facility: PHYSICIAN GROUP | Age: 60
End: 2020-03-18
Payer: COMMERCIAL

## 2020-03-18 VITALS
OXYGEN SATURATION: 95 % | TEMPERATURE: 98 F | SYSTOLIC BLOOD PRESSURE: 138 MMHG | BODY MASS INDEX: 23.95 KG/M2 | HEART RATE: 68 BPM | DIASTOLIC BLOOD PRESSURE: 92 MMHG | RESPIRATION RATE: 14 BRPM | HEIGHT: 68 IN | WEIGHT: 158 LBS

## 2020-03-18 DIAGNOSIS — N30.01 ACUTE CYSTITIS WITH HEMATURIA: ICD-10-CM

## 2020-03-18 DIAGNOSIS — R30.0 DYSURIA: ICD-10-CM

## 2020-03-18 LAB
APPEARANCE UR: CLEAR
BILIRUB UR STRIP-MCNC: NORMAL MG/DL
COLOR UR AUTO: NORMAL
GLUCOSE UR STRIP.AUTO-MCNC: 100 MG/DL
KETONES UR STRIP.AUTO-MCNC: NORMAL MG/DL
LEUKOCYTE ESTERASE UR QL STRIP.AUTO: NORMAL
NITRITE UR QL STRIP.AUTO: POSITIVE
PH UR STRIP.AUTO: 7 [PH] (ref 5–8)
PROT UR QL STRIP: NORMAL MG/DL
RBC UR QL AUTO: NORMAL
SP GR UR STRIP.AUTO: 1.01
UROBILINOGEN UR STRIP-MCNC: 1 MG/DL

## 2020-03-18 PROCEDURE — 87086 URINE CULTURE/COLONY COUNT: CPT

## 2020-03-18 PROCEDURE — 81002 URINALYSIS NONAUTO W/O SCOPE: CPT | Performed by: PHYSICIAN ASSISTANT

## 2020-03-18 PROCEDURE — 99214 OFFICE O/P EST MOD 30 MIN: CPT | Performed by: PHYSICIAN ASSISTANT

## 2020-03-18 RX ORDER — PHENAZOPYRIDINE HYDROCHLORIDE 200 MG/1
200 TABLET, FILM COATED ORAL 3 TIMES DAILY
Qty: 6 TAB | Refills: 0 | Status: SHIPPED | OUTPATIENT
Start: 2020-03-18 | End: 2020-03-20

## 2020-03-18 RX ORDER — NITROFURANTOIN 25; 75 MG/1; MG/1
100 CAPSULE ORAL EVERY 12 HOURS
Qty: 10 CAP | Refills: 0 | Status: SHIPPED | OUTPATIENT
Start: 2020-03-18 | End: 2020-03-23

## 2020-03-18 ASSESSMENT — FIBROSIS 4 INDEX: FIB4 SCORE: 1.07

## 2020-03-18 NOTE — PROGRESS NOTES
Chief Complaint   Patient presents with   • UTI       HISTORY OF PRESENT ILLNESS: Patient is a 59 y.o. female who presents today because she has a 1 day history of increased urinary urgency, frequency, dysuria.  She did try some over-the-counter Azo which did help with some of the dysuria.  She denies any fevers, chills, nausea, vomiting or diarrhea or flank pain.    Patient Active Problem List    Diagnosis Date Noted   • Vitamin D deficiency 11/18/2019   • Plantar fasciitis 05/09/2019   • Acute cystitis without hematuria 03/21/2019   • Fasting hyperglycemia 11/29/2017   • Anxiety 11/06/2015   • Menopause syndrome 03/01/2012   • Hypertension    • Cervicalgia    • PSVT (paroxysmal supraventricular tachycardia) (CMS-[facility])        Allergies:Patient has no known allergies.    Current Outpatient Medications Ordered in Epic   Medication Sig Dispense Refill   • nitrofurantoin (MACROBID) 100 MG Cap Take 1 Cap by mouth every 12 hours for 5 days. 10 Cap 0   • phenazopyridine (PYRIDIUM) 200 MG Tab Take 1 Tab by mouth 3 times a day for 2 days. 6 Tab 0   • VITAMIN D PO Take  by mouth.     • metoprolol SR (TOPROL XL) 100 MG TABLET SR 24 HR Take 1 Tab by mouth every day. 90 Tab 1   • lisinopril (PRINIVIL) 20 MG Tab Take 1 Tab by mouth 2 times a day. 180 Tab 3   • glucosamine Sulfate 500 MG Cap Take 500 mg by mouth 3 times a day, with meals.     • Multiple Vitamin (MULTI-VITAMIN PO) Take  by mouth.     • Calcium 600 MG TABS Take 1,200 mg by mouth every day.       No current [facility]-ordered facility-administered medications on file.        Past Medical History:   Diagnosis Date   • Anxiety 11/6/2015   • Cervicalgia    • Essential hypertension, malignant    • FH: colon cancer     maternal, typically diagnosed in 50's   • FH: stroke     paternal    • Generalized anxiety disorder    • Hypertension    • Menopause syndrome 3/1/2012   • Pap smear 03/13/2007   • Premenstrual tension syndromes    • S/P VH (vaginal hysterectomy) 07/2007     "secondary  to menorrhagia without BSO   • Screening mammogram 02/26/2008   • Shortness of breath    • Tachycardia, unspecified    • Ultrasound scan abnormal 3/15/2007    Pelvic, enlarged uterus with miltiple masses in the uterine wall consisten with leiomyas       Social History     Tobacco Use   • Smoking status: Never Smoker   • Smokeless tobacco: Never Used   Substance Use Topics   • Alcohol use: Yes     Alcohol/week: 1.2 oz     Types: 2 Standard drinks or equivalent per week     Frequency: 2-3 times a week     Comment: rare   • Drug use: No       Family Status   Relation Name Status   • Mo  Alive   • Fa  Alive        overweight     Family History   Problem Relation Age of Onset   • Hypertension Mother    • Arthritis Mother         severe osteoarthritis   • Hypertension Father        ROS:  Review of Systems   Constitutional: Negative for fever, chills, weight loss and malaise/fatigue.   HENT: Negative for ear pain, nosebleeds, congestion, sore throat and neck pain.    Eyes: Negative for blurred vision.   Respiratory: Negative for cough, sputum production, shortness of breath and wheezing.    Cardiovascular: Negative for chest pain, palpitations, orthopnea and leg swelling.   Gastrointestinal: Negative for heartburn, nausea, vomiting and abdominal pain.   Genitourinary: Positive for dysuria, urgency and frequency.     Exam:  /92 (BP Location: Right arm, Patient Position: Sitting, BP Cuff Size: Adult)   Pulse 68   Temp 36.7 °C (98 °F) (Temporal)   Resp 14   Ht 1.727 m (5' 8\")   Wt 71.7 kg (158 lb)   SpO2 95%   General:  Well nourished, well developed female in NAD  Head:Normocephalic, atraumatic  Eyes: PERRLA, EOM within normal limits, no conjunctival injection, no scleral icterus, visual fields and acuity grossly intact.  Nose: Symmetrical without tenderness, no discharge.  Mouth: reasonable hygiene, no erythema exudates or tonsillar enlargement.  Neck: no masses, range of motion within normal limits, " no tracheal deviation. No obvious thyroid enlargement.  Extremities: no clubbing, cyanosis, or edema.    Urinalysis of blood, leuks, nitrates.    Please note that this dictation was created using voice recognition software. I have made every reasonable attempt to correct obvious errors, but I expect that there are errors of grammar and possibly content that I did not discover before finalizing the note.    Assessment/Plan:  1. Acute cystitis with hematuria  Urine Culture    nitrofurantoin (MACROBID) 100 MG Cap   2. Dysuria  POCT Urinalysis    phenazopyridine (PYRIDIUM) 200 MG Tab   Increase p.o. fluids    Followup with primary care in the next 7-10 days if not significantly improving, return to the urgent care or go to the emergency room sooner for any worsening of symptoms.

## 2020-03-21 LAB
BACTERIA UR CULT: NORMAL
SIGNIFICANT IND 70042: NORMAL
SITE SITE: NORMAL
SOURCE SOURCE: NORMAL

## 2020-06-15 RX ORDER — METOPROLOL SUCCINATE 100 MG/1
TABLET, EXTENDED RELEASE ORAL
Qty: 90 TAB | Refills: 3 | Status: SHIPPED | OUTPATIENT
Start: 2020-06-15 | End: 2020-09-29 | Stop reason: SDUPTHER

## 2020-09-11 DIAGNOSIS — I10 ESSENTIAL HYPERTENSION: ICD-10-CM

## 2020-09-12 NOTE — TELEPHONE ENCOUNTER
Received request via: Patient    Was the patient seen in the last year in this department? Yes    Does the patient have an active prescription (recently filled or refills available) for medication(s) requested? No     Last OV 2/19/2020

## 2020-09-14 RX ORDER — LISINOPRIL 20 MG/1
20 TABLET ORAL 2 TIMES DAILY
Qty: 180 TAB | Refills: 3 | Status: SHIPPED | OUTPATIENT
Start: 2020-09-14 | End: 2021-10-04 | Stop reason: SDUPTHER

## 2020-09-21 ENCOUNTER — TELEPHONE (OUTPATIENT)
Dept: SCHEDULING | Facility: IMAGING CENTER | Age: 60
End: 2020-09-21

## 2020-09-21 NOTE — TELEPHONE ENCOUNTER
Good Afternoon Dr. Ortiz,    Patient is request order for routine lab work.     Please advise.    Thank you.

## 2020-09-23 ENCOUNTER — HOSPITAL ENCOUNTER (OUTPATIENT)
Dept: LAB | Facility: MEDICAL CENTER | Age: 60
End: 2020-09-23
Attending: INTERNAL MEDICINE
Payer: COMMERCIAL

## 2020-09-23 DIAGNOSIS — I10 ESSENTIAL HYPERTENSION: ICD-10-CM

## 2020-09-23 DIAGNOSIS — E78.5 HYPERLIPIDEMIA, UNSPECIFIED HYPERLIPIDEMIA TYPE: ICD-10-CM

## 2020-09-23 LAB
ALBUMIN SERPL BCP-MCNC: 4.2 G/DL (ref 3.2–4.9)
ALBUMIN/GLOB SERPL: 1.5 G/DL
ALP SERPL-CCNC: 94 U/L (ref 30–99)
ALT SERPL-CCNC: 48 U/L (ref 2–50)
ANION GAP SERPL CALC-SCNC: 10 MMOL/L (ref 7–16)
AST SERPL-CCNC: 32 U/L (ref 12–45)
BILIRUB SERPL-MCNC: 0.8 MG/DL (ref 0.1–1.5)
BUN SERPL-MCNC: 19 MG/DL (ref 8–22)
CALCIUM SERPL-MCNC: 9.4 MG/DL (ref 8.5–10.5)
CHLORIDE SERPL-SCNC: 103 MMOL/L (ref 96–112)
CHOLEST SERPL-MCNC: 177 MG/DL (ref 100–199)
CO2 SERPL-SCNC: 27 MMOL/L (ref 20–33)
CREAT SERPL-MCNC: 0.74 MG/DL (ref 0.5–1.4)
FASTING STATUS PATIENT QL REPORTED: NORMAL
GLOBULIN SER CALC-MCNC: 2.8 G/DL (ref 1.9–3.5)
GLUCOSE SERPL-MCNC: 91 MG/DL (ref 65–99)
HDLC SERPL-MCNC: 64 MG/DL
LDLC SERPL CALC-MCNC: 92 MG/DL
POTASSIUM SERPL-SCNC: 4.4 MMOL/L (ref 3.6–5.5)
PROT SERPL-MCNC: 7 G/DL (ref 6–8.2)
SODIUM SERPL-SCNC: 140 MMOL/L (ref 135–145)
TRIGL SERPL-MCNC: 104 MG/DL (ref 0–149)

## 2020-09-23 PROCEDURE — 80053 COMPREHEN METABOLIC PANEL: CPT

## 2020-09-23 PROCEDURE — 36415 COLL VENOUS BLD VENIPUNCTURE: CPT

## 2020-09-23 PROCEDURE — 80061 LIPID PANEL: CPT

## 2020-09-29 ENCOUNTER — OFFICE VISIT (OUTPATIENT)
Dept: MEDICAL GROUP | Facility: PHYSICIAN GROUP | Age: 60
End: 2020-09-29
Payer: COMMERCIAL

## 2020-09-29 VITALS
BODY MASS INDEX: 24.1 KG/M2 | TEMPERATURE: 98.1 F | OXYGEN SATURATION: 99 % | SYSTOLIC BLOOD PRESSURE: 128 MMHG | HEART RATE: 64 BPM | DIASTOLIC BLOOD PRESSURE: 82 MMHG | WEIGHT: 159 LBS | RESPIRATION RATE: 14 BRPM | HEIGHT: 68 IN

## 2020-09-29 DIAGNOSIS — Z12.31 ENCOUNTER FOR SCREENING MAMMOGRAM FOR BREAST CANCER: ICD-10-CM

## 2020-09-29 DIAGNOSIS — E55.9 VITAMIN D DEFICIENCY: ICD-10-CM

## 2020-09-29 DIAGNOSIS — I10 ESSENTIAL HYPERTENSION: ICD-10-CM

## 2020-09-29 DIAGNOSIS — Z23 NEED FOR VACCINATION: ICD-10-CM

## 2020-09-29 DIAGNOSIS — R73.01 FASTING HYPERGLYCEMIA: ICD-10-CM

## 2020-09-29 PROCEDURE — 90686 IIV4 VACC NO PRSV 0.5 ML IM: CPT | Performed by: INTERNAL MEDICINE

## 2020-09-29 PROCEDURE — 90471 IMMUNIZATION ADMIN: CPT | Performed by: INTERNAL MEDICINE

## 2020-09-29 PROCEDURE — 99214 OFFICE O/P EST MOD 30 MIN: CPT | Mod: 25 | Performed by: INTERNAL MEDICINE

## 2020-09-29 RX ORDER — NEOMYCIN SULFATE, POLYMYXIN B SULFATE, AND DEXAMETHASONE 3.5; 10000; 1 MG/G; [USP'U]/G; MG/G
OINTMENT OPHTHALMIC
COMMUNITY
Start: 2020-09-24 | End: 2022-04-02

## 2020-09-29 RX ORDER — METOPROLOL SUCCINATE 100 MG/1
100 TABLET, EXTENDED RELEASE ORAL 2 TIMES DAILY
Qty: 180 TAB | Refills: 3 | Status: SHIPPED | OUTPATIENT
Start: 2020-09-29 | End: 2021-10-04 | Stop reason: SDUPTHER

## 2020-09-29 RX ORDER — TOBRAMYCIN AND DEXAMETHASONE 3; 1 MG/ML; MG/ML
SUSPENSION/ DROPS OPHTHALMIC
COMMUNITY
Start: 2020-07-10 | End: 2021-10-18

## 2020-09-29 ASSESSMENT — FIBROSIS 4 INDEX: FIB4 SCORE: 1

## 2020-09-29 NOTE — PROGRESS NOTES
Chief Complaint   Patient presents with   • Annual Exam       HISTORY OF PRESENT ILLNESS: Patient is a 60 y.o. female established patient who presents today to discuss the medical issues below.    Hypertension  Patient is following blood pressure at home.  She tends to get numbers in the 140/85 range at home.  Denies chest pain palpitations or edema.  She does have a large family history of hypertension.    Fasting hyperglycemia  Patient is currently working on low carbohydrate diet.  No polydipsia polyuria.  Had lab work done.  Weight is stable.    Vitamin D deficiency  Patient continues on vitamin D supplementation.      Patient Active Problem List    Diagnosis Date Noted   • Vitamin D deficiency 11/18/2019   • Plantar fasciitis 05/09/2019   • Acute cystitis without hematuria 03/21/2019   • Fasting hyperglycemia 11/29/2017   • Anxiety 11/06/2015   • Menopause syndrome 03/01/2012   • Hypertension    • Cervicalgia    • PSVT (paroxysmal supraventricular tachycardia) (CMS-[facility])        Allergies:Patient has no known allergies.    Current Outpatient Medications   Medication Sig Dispense Refill   • neomycin-polymixin-dexamethasone (MAXITROL) 3.5-86952-6.1 Ointment ophthalmic ointment      • tobramycin-dexamethasone (TOBRADEX) 0.3-0.1 % Suspension      • Zoster Vac Recomb Adjuvanted (SHINGRIX) 50 MCG/0.5ML Recon Susp 0.5 mL by Intramuscular route Once for 1 dose. 0.5 mL 0   • metoprolol SR (TOPROL XL) 100 MG TABLET SR 24 HR Take 1 Tab by mouth 2 Times a Day. 180 Tab 3   • lisinopril (PRINIVIL) 20 MG Tab Take 1 Tab by mouth 2 times a day. 180 Tab 3   • VITAMIN D PO Take  by mouth.     • glucosamine Sulfate 500 MG Cap Take 500 mg by mouth 3 times a day, with meals.     • Multiple Vitamin (MULTI-VITAMIN PO) Take  by mouth.     • Calcium 600 MG TABS Take 1,200 mg by mouth every day.       No current facility-administered medications for this visit.          Past Medical History:   Diagnosis Date   • Anxiety 11/6/2015   •  "Cervicalgia    • Essential hypertension, malignant    • FH: colon cancer     maternal, typically diagnosed in 50's   • FH: stroke     paternal    • Generalized anxiety disorder    • Hypertension    • Menopause syndrome 3/1/2012   • Pap smear 03/13/2007   • Premenstrual tension syndromes    • S/P VH (vaginal hysterectomy) 07/2007    secondary  to menorrhagia without BSO   • Screening mammogram 02/26/2008   • Shortness of breath    • Tachycardia, unspecified    • Ultrasound scan abnormal 3/15/2007    Pelvic, enlarged uterus with miltiple masses in the uterine wall consisten with leiomyas       Social History     Tobacco Use   • Smoking status: Never Smoker   • Smokeless tobacco: Never Used   Substance Use Topics   • Alcohol use: Yes     Alcohol/week: 1.2 oz     Types: 2 Standard drinks or equivalent per week     Frequency: 2-3 times a week     Comment: rare   • Drug use: No       Family Status   Relation Name Status   • Mo  Alive   • Fa  Alive        overweight     Family History   Problem Relation Age of Onset   • Hypertension Mother    • Arthritis Mother         severe osteoarthritis   • Hypertension Father        ROS:    Respiratory: Negative for cough, sputum production, shortness of breath or wheezing.    Cardiovascular: Negative for chest pain, palpitations, orthopnea, dyspnea with exertion or edema.   Gastrointestinal: Negative for GI upset, nausea, vomiting, abdominal pain, constipation or diarrhea.   Genitourinary: Negative for dysuria, urgency, hesitancy or frequency.       Exam:    /82 (BP Location: Left arm, Patient Position: Sitting, BP Cuff Size: Adult)   Pulse 64   Temp 36.7 °C (98.1 °F) (Temporal)   Resp 14   Ht 1.727 m (5' 8\")   Wt 72.1 kg (159 lb)   SpO2 99%  Body mass index is 24.18 kg/m².  To my reading 142/86  General:  Well nourished, well developed female in NAD.  Pulmonary: Clear to ausculation and percussion.  Normal effort. No rales, rhonchi, or wheezing.  Cardiovascular: Regular " rate and rhythm without murmur.   Abdomen: Normal bowel sounds soft and nontender no palpable liver spleen bladder mass.  Extremities: No LE edema noted.  Neuro: Grossly nonfocal.  Psych: Alert and oriented to person, place, and time. Appropriate mood and conversation.    LABS: Results reviewed and discussed with the patient, questions answered.      This dictation was created using voice recognition software. I have made reasonable attempts to correct errors, however, errors of grammar and content may exist.          Assessment/Plan:    1. Need for vaccination    - Zoster Vac Recomb Adjuvanted (SHINGRIX) 50 MCG/0.5ML Recon Susp; 0.5 mL by Intramuscular route Once for 1 dose.  Dispense: 0.5 mL; Refill: 0  - Influenza Vaccine Quad Injection (PF)    2. Encounter for screening mammogram for breast cancer  Orders written.  Clinically stable.  - MA-SCREENING MAMMO BILAT W/CAD; Future    3. Essential hypertension  Borderline will increase metoprolol to twice daily continuing on lisinopril at 40 mg total dose.  My chart report on how her blood pressure is doing.  May need to add additional medication discussed with patient.  She is doing quite well on low-salt diet regular exercise and her weight is fine.    4. Fasting hyperglycemia  Resolved with dietary    5. Vitamin D deficiency  Adequately replaced on labs in Feb.  Continue present management.       Patient was seen for 25 minutes face to face of which more than 50% of the time was spent in counseling and coordination of care regarding the above problems.

## 2020-09-29 NOTE — ASSESSMENT & PLAN NOTE
Patient is following blood pressure at home.  She tends to get numbers in the 140/85 range at home.  Denies chest pain palpitations or edema.  She does have a large family history of hypertension.

## 2020-09-29 NOTE — ASSESSMENT & PLAN NOTE
Patient is currently working on low carbohydrate diet.  No polydipsia polyuria.  Had lab work done.  Weight is stable.

## 2020-10-27 ENCOUNTER — HOSPITAL ENCOUNTER (OUTPATIENT)
Dept: RADIOLOGY | Facility: MEDICAL CENTER | Age: 60
End: 2020-10-27
Attending: INTERNAL MEDICINE
Payer: COMMERCIAL

## 2020-10-27 DIAGNOSIS — Z12.31 ENCOUNTER FOR SCREENING MAMMOGRAM FOR BREAST CANCER: ICD-10-CM

## 2020-10-27 PROCEDURE — 77067 SCR MAMMO BI INCL CAD: CPT

## 2021-01-18 ENCOUNTER — HOSPITAL ENCOUNTER (OUTPATIENT)
Facility: MEDICAL CENTER | Age: 61
End: 2021-01-18
Attending: NURSE PRACTITIONER
Payer: COMMERCIAL

## 2021-01-18 ENCOUNTER — OFFICE VISIT (OUTPATIENT)
Dept: URGENT CARE | Facility: PHYSICIAN GROUP | Age: 61
End: 2021-01-18
Payer: COMMERCIAL

## 2021-01-18 VITALS
TEMPERATURE: 99.2 F | SYSTOLIC BLOOD PRESSURE: 136 MMHG | BODY MASS INDEX: 24.25 KG/M2 | HEART RATE: 66 BPM | RESPIRATION RATE: 14 BRPM | DIASTOLIC BLOOD PRESSURE: 80 MMHG | HEIGHT: 68 IN | WEIGHT: 160 LBS | OXYGEN SATURATION: 99 %

## 2021-01-18 DIAGNOSIS — N30.01 ACUTE CYSTITIS WITH HEMATURIA: ICD-10-CM

## 2021-01-18 LAB
APPEARANCE UR: CLEAR
BILIRUB UR STRIP-MCNC: NORMAL MG/DL
COLOR UR AUTO: YELLOW
GLUCOSE UR STRIP.AUTO-MCNC: NORMAL MG/DL
KETONES UR STRIP.AUTO-MCNC: NORMAL MG/DL
LEUKOCYTE ESTERASE UR QL STRIP.AUTO: NORMAL
NITRITE UR QL STRIP.AUTO: NORMAL
PH UR STRIP.AUTO: 6 [PH] (ref 5–8)
PROT UR QL STRIP: NORMAL MG/DL
RBC UR QL AUTO: NORMAL
SP GR UR STRIP.AUTO: 1.1
UROBILINOGEN UR STRIP-MCNC: 0.2 MG/DL

## 2021-01-18 PROCEDURE — 99214 OFFICE O/P EST MOD 30 MIN: CPT | Performed by: NURSE PRACTITIONER

## 2021-01-18 PROCEDURE — 81002 URINALYSIS NONAUTO W/O SCOPE: CPT | Performed by: NURSE PRACTITIONER

## 2021-01-18 PROCEDURE — 87086 URINE CULTURE/COLONY COUNT: CPT

## 2021-01-18 RX ORDER — NITROFURANTOIN 25; 75 MG/1; MG/1
100 CAPSULE ORAL 2 TIMES DAILY
Qty: 10 CAP | Refills: 0 | Status: SHIPPED | OUTPATIENT
Start: 2021-01-18 | End: 2021-01-18

## 2021-01-18 RX ORDER — NITROFURANTOIN 25; 75 MG/1; MG/1
100 CAPSULE ORAL 2 TIMES DAILY
Qty: 10 CAP | Refills: 0 | Status: SHIPPED | OUTPATIENT
Start: 2021-01-18 | End: 2021-01-23

## 2021-01-18 ASSESSMENT — ENCOUNTER SYMPTOMS
DIZZINESS: 0
NAUSEA: 0
DIARRHEA: 0
CONSTIPATION: 0
TINGLING: 0
ORTHOPNEA: 0
HEARTBURN: 0
VOMITING: 0
WHEEZING: 0
FLANK PAIN: 0
SWEATS: 0
BACK PAIN: 0
MYALGIAS: 0
PALPITATIONS: 0
MEMORY LOSS: 0
FEVER: 0
COUGH: 0
HEADACHES: 0
SORE THROAT: 0
SHORTNESS OF BREATH: 0
CHILLS: 0

## 2021-01-18 ASSESSMENT — FIBROSIS 4 INDEX: FIB4 SCORE: 1

## 2021-01-18 NOTE — PROGRESS NOTES
"Subjective:      Fatoumata Gutiérrez is a 60 y.o. female who presents with Painful Urination (onset 1 day )            Dysuria   This is a new problem. The current episode started today. The problem occurs every urination. The problem has been gradually worsening. The quality of the pain is described as burning. The pain is at a severity of 5/10. The pain is moderate. There has been no fever. There is no history of pyelonephritis. Associated symptoms include frequency, hesitancy and urgency. Pertinent negatives include no chills, discharge, flank pain, hematuria, nausea, possible pregnancy, sweats or vomiting. Treatments tried: Azo. The treatment provided mild relief. There is no history of catheterization, kidney stones, recurrent UTIs or a single kidney.       Review of Systems   Constitutional: Negative for chills, fever and malaise/fatigue.   HENT: Negative for ear pain and sore throat.    Respiratory: Negative for cough, shortness of breath and wheezing.    Cardiovascular: Negative for chest pain, palpitations, orthopnea and leg swelling.   Gastrointestinal: Negative for constipation, diarrhea, heartburn, nausea and vomiting.   Genitourinary: Positive for dysuria, frequency, hesitancy and urgency. Negative for flank pain and hematuria.   Musculoskeletal: Negative for back pain, joint pain and myalgias.   Skin: Negative for rash.   Neurological: Negative for dizziness, tingling and headaches.   Psychiatric/Behavioral: Negative for memory loss and suicidal ideas.   All other systems reviewed and are negative.         Objective:     /80   Pulse 66   Temp 37.3 °C (99.2 °F)   Resp 14   Ht 1.727 m (5' 8\")   Wt 72.6 kg (160 lb)   SpO2 99%   BMI 24.33 kg/m²      Physical Exam  Vitals signs reviewed.   Constitutional:       General: She is not in acute distress.     Appearance: Normal appearance.   HENT:      Head: Normocephalic and atraumatic.      Right Ear: External ear normal.      Left Ear: External ear " normal.   Eyes:      Pupils: Pupils are equal, round, and reactive to light.   Neck:      Musculoskeletal: Normal range of motion and neck supple.   Cardiovascular:      Rate and Rhythm: Normal rate and regular rhythm.      Pulses: Normal pulses.      Heart sounds: No friction rub. No gallop.    Pulmonary:      Effort: Pulmonary effort is normal. No respiratory distress.      Breath sounds: Normal breath sounds.   Abdominal:      General: Bowel sounds are normal. There is no distension.      Palpations: Abdomen is soft. There is no mass.      Tenderness: There is abdominal tenderness in the suprapubic area. There is no right CVA tenderness or left CVA tenderness.   Musculoskeletal: Normal range of motion.         General: No tenderness.      Right lower leg: No edema.      Left lower leg: No edema.   Skin:     General: Skin is warm and dry.   Neurological:      Mental Status: She is alert and oriented to person, place, and time.   Psychiatric:         Mood and Affect: Mood normal.         Behavior: Behavior normal.                 Lab Results/POC Test Results   Results for orders placed or performed in visit on 01/18/21   POCT Urinalysis   Result Value Ref Range    POC Color yellow Negative    POC Appearance clear Negative    POC Leukocyte Esterase samll Negative    POC Nitrites neg Negative    POC Urobiligen 0.2 Negative (0.2) mg/dL    POC Protein neg Negative mg/dL    POC Urine PH 6.0 5.0 - 8.0    POC Blood mod Negative    POC Specific Gravity 1.10 <1.005 - >1.030    POC Ketones neg Negative mg/dL    POC Bilirubin neg Negative mg/dL    POC Glucose neg Negative mg/dL           Assessment/Plan:        1. Acute cystitis with hematuria  POCT Urinalysis    URINE CULTURE(NEW)    nitrofurantoin (MACROBID) 100 MG Cap     Discussed with patient signs and symptoms are consistent with a simple urinary tract infection.   They are overall very well-appearing with normal vital signs and benign examination findings. Suspicions  for pyelonephritis, kidney stone, or emergent pathology are low.     Discussed treatment of nitrofurantoin for 5 days, increased fluid intake, AZO per manufacturers instructions for burning urination.  Urine culture: will call back only if positive and if necessary change in therapy.     Advised to return to the Urgent Care or follow up with their PCP if symptoms are not improving in 2-3 days or sooner if any worsening symptoms such as fever, chills, abdominal pain, back/flank pain, nausea, vomiting, or any other concerns.

## 2021-01-19 DIAGNOSIS — N30.01 ACUTE CYSTITIS WITH HEMATURIA: ICD-10-CM

## 2021-01-22 LAB
BACTERIA UR CULT: NORMAL
SIGNIFICANT IND 70042: NORMAL
SITE SITE: NORMAL
SOURCE SOURCE: NORMAL

## 2021-10-04 ENCOUNTER — TELEPHONE (OUTPATIENT)
Dept: MEDICAL GROUP | Facility: PHYSICIAN GROUP | Age: 61
End: 2021-10-04

## 2021-10-04 DIAGNOSIS — R73.01 FASTING HYPERGLYCEMIA: ICD-10-CM

## 2021-10-04 DIAGNOSIS — I10 ESSENTIAL HYPERTENSION: ICD-10-CM

## 2021-10-04 DIAGNOSIS — I10 PRIMARY HYPERTENSION: ICD-10-CM

## 2021-10-04 RX ORDER — METOPROLOL SUCCINATE 100 MG/1
100 TABLET, EXTENDED RELEASE ORAL 2 TIMES DAILY
Qty: 180 TABLET | Refills: 3 | Status: SHIPPED | OUTPATIENT
Start: 2021-10-04 | End: 2022-05-16 | Stop reason: SDUPTHER

## 2021-10-04 RX ORDER — LISINOPRIL 20 MG/1
20 TABLET ORAL 2 TIMES DAILY
Qty: 180 TABLET | Refills: 3 | Status: SHIPPED | OUTPATIENT
Start: 2021-10-04 | End: 2022-05-16 | Stop reason: SDUPTHER

## 2021-10-04 NOTE — TELEPHONE ENCOUNTER
Please notify the patient I have refilled her meds, I recommend she have labs done prior to the OV and have placed orders for the labs.

## 2021-10-04 NOTE — TELEPHONE ENCOUNTER
Patient has an appt with you 10/18/21 to get her meds refilled but she is going to run out of her lisinopril before then. Can we do a temp supply to [facility] until she is seen? Please advise

## 2021-10-11 ENCOUNTER — HOSPITAL ENCOUNTER (OUTPATIENT)
Dept: LAB | Facility: MEDICAL CENTER | Age: 61
End: 2021-10-11
Attending: INTERNAL MEDICINE
Payer: COMMERCIAL

## 2021-10-11 DIAGNOSIS — R73.01 FASTING HYPERGLYCEMIA: ICD-10-CM

## 2021-10-11 DIAGNOSIS — I10 PRIMARY HYPERTENSION: ICD-10-CM

## 2021-10-11 LAB
ALBUMIN SERPL BCP-MCNC: 4.1 G/DL (ref 3.2–4.9)
ALBUMIN/GLOB SERPL: 1.6 G/DL
ALP SERPL-CCNC: 81 U/L (ref 30–99)
ALT SERPL-CCNC: 20 U/L (ref 2–50)
ANION GAP SERPL CALC-SCNC: 12 MMOL/L (ref 7–16)
AST SERPL-CCNC: 20 U/L (ref 12–45)
BASOPHILS # BLD AUTO: 0.6 % (ref 0–1.8)
BASOPHILS # BLD: 0.04 K/UL (ref 0–0.12)
BILIRUB SERPL-MCNC: 0.7 MG/DL (ref 0.1–1.5)
BUN SERPL-MCNC: 19 MG/DL (ref 8–22)
CALCIUM SERPL-MCNC: 9.1 MG/DL (ref 8.5–10.5)
CHLORIDE SERPL-SCNC: 107 MMOL/L (ref 96–112)
CHOLEST SERPL-MCNC: 169 MG/DL (ref 100–199)
CO2 SERPL-SCNC: 25 MMOL/L (ref 20–33)
CREAT SERPL-MCNC: 0.76 MG/DL (ref 0.5–1.4)
EOSINOPHIL # BLD AUTO: 0.11 K/UL (ref 0–0.51)
EOSINOPHIL NFR BLD: 1.8 % (ref 0–6.9)
ERYTHROCYTE [DISTWIDTH] IN BLOOD BY AUTOMATED COUNT: 41.3 FL (ref 35.9–50)
EST. AVERAGE GLUCOSE BLD GHB EST-MCNC: 120 MG/DL
FASTING STATUS PATIENT QL REPORTED: NORMAL
GLOBULIN SER CALC-MCNC: 2.6 G/DL (ref 1.9–3.5)
GLUCOSE SERPL-MCNC: 101 MG/DL (ref 65–99)
HBA1C MFR BLD: 5.8 % (ref 4–5.6)
HCT VFR BLD AUTO: 44.5 % (ref 37–47)
HDLC SERPL-MCNC: 61 MG/DL
HGB BLD-MCNC: 14.9 G/DL (ref 12–16)
IMM GRANULOCYTES # BLD AUTO: 0.02 K/UL (ref 0–0.11)
IMM GRANULOCYTES NFR BLD AUTO: 0.3 % (ref 0–0.9)
LDLC SERPL CALC-MCNC: 90 MG/DL
LYMPHOCYTES # BLD AUTO: 1.73 K/UL (ref 1–4.8)
LYMPHOCYTES NFR BLD: 28.1 % (ref 22–41)
MCH RBC QN AUTO: 30.2 PG (ref 27–33)
MCHC RBC AUTO-ENTMCNC: 33.5 G/DL (ref 33.6–35)
MCV RBC AUTO: 90.1 FL (ref 81.4–97.8)
MONOCYTES # BLD AUTO: 0.45 K/UL (ref 0–0.85)
MONOCYTES NFR BLD AUTO: 7.3 % (ref 0–13.4)
NEUTROPHILS # BLD AUTO: 3.81 K/UL (ref 2–7.15)
NEUTROPHILS NFR BLD: 61.9 % (ref 44–72)
NRBC # BLD AUTO: 0 K/UL
NRBC BLD-RTO: 0 /100 WBC
PLATELET # BLD AUTO: 272 K/UL (ref 164–446)
PMV BLD AUTO: 9.8 FL (ref 9–12.9)
POTASSIUM SERPL-SCNC: 4.1 MMOL/L (ref 3.6–5.5)
PROT SERPL-MCNC: 6.7 G/DL (ref 6–8.2)
RBC # BLD AUTO: 4.94 M/UL (ref 4.2–5.4)
SODIUM SERPL-SCNC: 144 MMOL/L (ref 135–145)
TRIGL SERPL-MCNC: 89 MG/DL (ref 0–149)
WBC # BLD AUTO: 6.2 K/UL (ref 4.8–10.8)

## 2021-10-11 PROCEDURE — 80061 LIPID PANEL: CPT

## 2021-10-11 PROCEDURE — 83036 HEMOGLOBIN GLYCOSYLATED A1C: CPT

## 2021-10-11 PROCEDURE — 36415 COLL VENOUS BLD VENIPUNCTURE: CPT

## 2021-10-11 PROCEDURE — 80053 COMPREHEN METABOLIC PANEL: CPT

## 2021-10-11 PROCEDURE — 85025 COMPLETE CBC W/AUTO DIFF WBC: CPT

## 2021-10-18 ENCOUNTER — OFFICE VISIT (OUTPATIENT)
Dept: MEDICAL GROUP | Facility: PHYSICIAN GROUP | Age: 61
End: 2021-10-18
Payer: COMMERCIAL

## 2021-10-18 VITALS
HEIGHT: 68 IN | RESPIRATION RATE: 14 BRPM | HEART RATE: 71 BPM | WEIGHT: 158 LBS | BODY MASS INDEX: 23.95 KG/M2 | DIASTOLIC BLOOD PRESSURE: 78 MMHG | OXYGEN SATURATION: 100 % | SYSTOLIC BLOOD PRESSURE: 126 MMHG | TEMPERATURE: 99 F

## 2021-10-18 DIAGNOSIS — I10 PRIMARY HYPERTENSION: ICD-10-CM

## 2021-10-18 DIAGNOSIS — R73.01 FASTING HYPERGLYCEMIA: ICD-10-CM

## 2021-10-18 DIAGNOSIS — Z12.31 ENCOUNTER FOR SCREENING MAMMOGRAM FOR BREAST CANCER: ICD-10-CM

## 2021-10-18 DIAGNOSIS — E55.9 VITAMIN D DEFICIENCY: ICD-10-CM

## 2021-10-18 DIAGNOSIS — I47.10 PAROXYSMAL SUPRAVENTRICULAR TACHYCARDIA (HCC): ICD-10-CM

## 2021-10-18 DIAGNOSIS — Z23 NEEDS FLU SHOT: ICD-10-CM

## 2021-10-18 DIAGNOSIS — E78.5 HYPERLIPIDEMIA, UNSPECIFIED HYPERLIPIDEMIA TYPE: ICD-10-CM

## 2021-10-18 PROCEDURE — 90471 IMMUNIZATION ADMIN: CPT | Performed by: INTERNAL MEDICINE

## 2021-10-18 PROCEDURE — 90686 IIV4 VACC NO PRSV 0.5 ML IM: CPT | Performed by: INTERNAL MEDICINE

## 2021-10-18 PROCEDURE — 99214 OFFICE O/P EST MOD 30 MIN: CPT | Mod: 25 | Performed by: INTERNAL MEDICINE

## 2021-10-18 RX ORDER — HYDROCHLOROTHIAZIDE 25 MG/1
25 TABLET ORAL DAILY
Qty: 90 TABLET | Refills: 3 | Status: SHIPPED | OUTPATIENT
Start: 2021-10-18 | End: 2022-05-16 | Stop reason: SDUPTHER

## 2021-10-18 ASSESSMENT — FIBROSIS 4 INDEX: FIB4 SCORE: 1.04

## 2021-10-18 ASSESSMENT — PATIENT HEALTH QUESTIONNAIRE - PHQ9: CLINICAL INTERPRETATION OF PHQ2 SCORE: 0

## 2021-10-18 NOTE — ASSESSMENT & PLAN NOTE
Remains on metoprolol 100 mb bid, lisinopril 20 mg a day.  Patient reports home bp readings higher than here,  Last week 160-150/89-90.  Occasional 125/60.  She feels fine, no chest pain palpitation or edema.

## 2021-10-18 NOTE — PATIENT INSTRUCTIONS
bp goal: 130/80 or below most of the time  Low salt  Ms dash salt substitute, potassium salt ok too.    Hctz 25 mg 1 a day.

## 2021-10-18 NOTE — PROGRESS NOTES
Chief Complaint   Patient presents with   • Medication Refill     wants to talk about shingrix,        HISTORY OF PRESENT ILLNESS: Patient is a 61 y.o. female established patient who presents today to discuss the medical issues below.    Hypertension  Remains on metoprolol 100 mb bid, lisinopril 20 mg a day.  Patient reports home bp readings higher than here,  Last week 160-150/89-90.  Occasional 125/60.  She feels fine, no chest pain palpitation or edema.    PSVT (paroxysmal supraventricular tachycardia) (CMS-[facility])  No breakthrough palpitations on metoprolol.      Fasting hyperglycemia  Weight is stable, low carbohydrate diet which she is managing well.      Vitamin D deficiency  On Vit supplements.        Patient Active Problem List    Diagnosis Date Noted   • Vitamin D deficiency 11/18/2019   • Plantar fasciitis 05/09/2019   • Acute cystitis without hematuria 03/21/2019   • Fasting hyperglycemia 11/29/2017   • Anxiety 11/06/2015   • Menopause syndrome 03/01/2012   • Hypertension    • Cervicalgia    • PSVT (paroxysmal supraventricular tachycardia) (CMS-[facility])        Allergies:Patient has no known allergies.    Current Outpatient Medications   Medication Sig Dispense Refill   • hydroCHLOROthiazide (HYDRODIURIL) 25 MG Tab Take 1 Tablet by mouth every day. 90 Tablet 3   • lisinopril (PRINIVIL) 20 MG Tab Take 1 Tablet by mouth 2 times a day. 180 Tablet 3   • metoprolol SR (TOPROL XL) 100 MG TABLET SR 24 HR Take 1 Tablet by mouth 2 times a day. 180 Tablet 3   • neomycin-polymixin-dexamethasone (MAXITROL) 3.5-99740-5.1 Ointment ophthalmic ointment      • VITAMIN D PO Take  by mouth.     • glucosamine Sulfate 500 MG Cap Take 500 mg by mouth 3 times a day, with meals.     • Multiple Vitamin (MULTI-VITAMIN PO) Take  by mouth.     • Calcium 600 MG TABS Take 1,200 mg by mouth every day.       No current facility-administered medications for this visit.         Past Medical History:   Diagnosis Date   • Anxiety 11/6/2015   •  "Cervicalgia    • Essential hypertension, malignant    • FH: colon cancer     maternal, typically diagnosed in 50's   • FH: stroke     paternal    • Generalized anxiety disorder    • Hypertension    • Menopause syndrome 3/1/2012   • Pap smear 03/13/2007   • Premenstrual tension syndromes    • S/P VH (vaginal hysterectomy) 07/2007    secondary  to menorrhagia without BSO   • Screening mammogram 02/26/2008   • Shortness of breath    • Tachycardia, unspecified    • Ultrasound scan abnormal 3/15/2007    Pelvic, enlarged uterus with miltiple masses in the uterine wall consisten with leiomyas       Social History     Tobacco Use   • Smoking status: Never Smoker   • Smokeless tobacco: Never Used   Vaping Use   • Vaping Use: Never used   Substance Use Topics   • Alcohol use: Yes     Alcohol/week: 1.2 oz     Types: 2 Standard drinks or equivalent per week     Comment: rare   • Drug use: No       Family Status   Relation Name Status   • Mo  Alive   • Fa  Alive        overweight     Family History   Problem Relation Age of Onset   • Hypertension Mother    • Arthritis Mother         severe osteoarthritis   • Hypertension Father        ROS:    Respiratory: Negative for cough, sputum production, shortness of breath or wheezing.    Cardiovascular: Negative for chest pain, palpitations, orthopnea, dyspnea with exertion or edema.   Gastrointestinal: Negative for GI upset, nausea, vomiting, abdominal pain, constipation or diarrhea.   Genitourinary: Negative for dysuria, urgency, hesitancy or frequency.       Exam:    /78 (BP Location: Left arm, Patient Position: Sitting, BP Cuff Size: Adult)   Pulse 71   Temp 37.2 °C (99 °F) (Temporal)   Resp 14   Ht 1.727 m (5' 8\")   Wt 71.7 kg (158 lb)   SpO2 100%  Body mass index is 24.02 kg/m².  General:  Well nourished, well developed female in NAD.  Pulmonary: Clear to ausculation and percussion.  Normal effort. No rales, rhonchi, or wheezing.  Cardiovascular: Regular rate and rhythm " without murmur.   Abdomen: Normal bowel sounds soft and nontender no palpable liver spleen bladder mass.  Extremities: No LE edema noted.  Neuro: Grossly nonfocal.  Psych: Alert and oriented to person, place, and time. Appropriate mood and conversation.    LABS: Results reviewed and discussed with the patient, questions answered.    This dictation was created using voice recognition software. I have made reasonable attempts to correct errors, however, errors of grammar and content may exist.          Assessment/Plan:    1. Encounter for screening mammogram for breast cancer  Reviewed recommendations with pt.   - MA-SCREENING MAMMO BILAT W/CAD; Future    2. Primary hypertension  Borderline, she has strong family history, weight is good, exercise program compliant.  Discussed at length, low salt diet, add HCTZ, bp goal and monitoring discussed.    - Comp Metabolic Panel; Future    3. PSVT (paroxysmal supraventricular tachycardia) (CMS-[facility])  No breakthru on the beta blocker.      4. Fasting hyperglycemia  Elevated A1c slight, implications discussed, she has family history, diet is good, likely hereditary, discussed, hold on metformin for now.    - HEMOGLOBIN A1C; Future    5. Vitamin D deficiency  On supplement, adequate.      6. Needs flu shot    - INFLUENZA VACCINE QUAD INJ (PF)    7. Hyperlipidemia, unspecified hyperlipidemia type  Borderline, on no meds, discussed implication, monitor sans meds for now.    - Lipid Profile; Future    Patient was seen for 25 minutes face to face of which more than 50% of the time was spent in counseling and coordination of care regarding the above problems.

## 2022-02-22 ENCOUNTER — APPOINTMENT (OUTPATIENT)
Dept: RADIOLOGY | Facility: MEDICAL CENTER | Age: 62
End: 2022-02-22
Attending: INTERNAL MEDICINE
Payer: COMMERCIAL

## 2022-03-15 ENCOUNTER — HOSPITAL ENCOUNTER (OUTPATIENT)
Dept: RADIOLOGY | Facility: MEDICAL CENTER | Age: 62
End: 2022-03-15
Attending: INTERNAL MEDICINE
Payer: COMMERCIAL

## 2022-03-15 DIAGNOSIS — Z12.31 VISIT FOR SCREENING MAMMOGRAM: ICD-10-CM

## 2022-03-15 PROCEDURE — 77063 BREAST TOMOSYNTHESIS BI: CPT

## 2022-04-02 ENCOUNTER — OFFICE VISIT (OUTPATIENT)
Dept: URGENT CARE | Facility: PHYSICIAN GROUP | Age: 62
End: 2022-04-02
Payer: COMMERCIAL

## 2022-04-02 VITALS
BODY MASS INDEX: 24.25 KG/M2 | SYSTOLIC BLOOD PRESSURE: 122 MMHG | HEIGHT: 68 IN | WEIGHT: 160 LBS | OXYGEN SATURATION: 97 % | HEART RATE: 79 BPM | DIASTOLIC BLOOD PRESSURE: 82 MMHG | TEMPERATURE: 98.2 F | RESPIRATION RATE: 16 BRPM

## 2022-04-02 DIAGNOSIS — R21 RASH AND NONSPECIFIC SKIN ERUPTION: ICD-10-CM

## 2022-04-02 PROCEDURE — 99213 OFFICE O/P EST LOW 20 MIN: CPT | Performed by: NURSE PRACTITIONER

## 2022-04-02 RX ORDER — TRIAMCINOLONE ACETONIDE 1 MG/G
1 CREAM TOPICAL 3 TIMES DAILY
Qty: 45 G | Refills: 0 | Status: SHIPPED | OUTPATIENT
Start: 2022-04-02 | End: 2022-04-09

## 2022-04-02 ASSESSMENT — ENCOUNTER SYMPTOMS
CHILLS: 0
FEVER: 0

## 2022-04-02 ASSESSMENT — FIBROSIS 4 INDEX: FIB4 SCORE: 1.04

## 2022-04-02 NOTE — PROGRESS NOTES
"Subjective     Fatoumata Gutiérrez is a 61 y.o. female who presents with Rash (On her left side x 6 weeks)            Fatoumata comes in today with a patchy dry rash on her abdomen x 6 weeks.  She denies any aggravating factors. She uses only gentle soaps, lotions and detergents with no recent product changes.  No new meds or foods.  No stress.  No history of similar symptoms.  She has tried OTC hydrocortisone cream and that improves but does not fully resolve the symptoms.  No redness or itching.        Review of Systems   Constitutional: Negative for chills, fever and malaise/fatigue.   Skin: Positive for rash. Negative for itching.     Medications, Allergies, and current problem list reviewed today in Epic         Objective     Blood Pressure 122/82   Pulse 79   Temperature 36.8 °C (98.2 °F) (Temporal)   Respiration 16   Height 1.727 m (5' 8\")   Weight 72.6 kg (160 lb)   Oxygen Saturation 97%   Body Mass Index 24.33 kg/m²      Physical Exam  Vitals reviewed.   Constitutional:       General: She is not in acute distress.     Appearance: Normal appearance. She is not ill-appearing, toxic-appearing or diaphoretic.   Cardiovascular:      Rate and Rhythm: Normal rate and regular rhythm.      Heart sounds: Normal heart sounds.   Pulmonary:      Effort: Pulmonary effort is normal.      Breath sounds: Normal breath sounds.   Skin:            Comments: 3 patches of dry, flesh-toned, slightly thickened skin on the abdomen with a fine light scale.  No erythema or sharp margins.  No satellite lesions.  No bruising, swelling, vesicles, crusting or weeping.     Neurological:      Mental Status: She is alert.   Psychiatric:         Mood and Affect: Mood normal.                             Assessment & Plan        1. Rash and nonspecific skin eruption  - triamcinolone acetonide (KENALOG) 0.1 % Cream; Apply 1 Application topically 3 times a day for 7 days. Apply sparingly to affected area  Dispense: 45 g; Refill: 0    Discussed " exam findings with Fatoumata. Etiology unclear.  Differential reviewed.  Kenalog cream as prescribed.    Follow up in 1 week for any persistent symptoms, sooner if worse.  She verbalized understanding of and agreed with plan of care.

## 2022-04-13 ENCOUNTER — HOSPITAL ENCOUNTER (OUTPATIENT)
Dept: LAB | Facility: MEDICAL CENTER | Age: 62
End: 2022-04-13
Attending: INTERNAL MEDICINE
Payer: COMMERCIAL

## 2022-04-13 DIAGNOSIS — E78.5 HYPERLIPIDEMIA, UNSPECIFIED HYPERLIPIDEMIA TYPE: ICD-10-CM

## 2022-04-13 DIAGNOSIS — R73.01 FASTING HYPERGLYCEMIA: ICD-10-CM

## 2022-04-13 DIAGNOSIS — I10 PRIMARY HYPERTENSION: ICD-10-CM

## 2022-04-13 LAB
ALBUMIN SERPL BCP-MCNC: 4.3 G/DL (ref 3.2–4.9)
ALBUMIN/GLOB SERPL: 1.7 G/DL
ALP SERPL-CCNC: 66 U/L (ref 30–99)
ALT SERPL-CCNC: 34 U/L (ref 2–50)
ANION GAP SERPL CALC-SCNC: 10 MMOL/L (ref 7–16)
AST SERPL-CCNC: 28 U/L (ref 12–45)
BILIRUB SERPL-MCNC: 0.7 MG/DL (ref 0.1–1.5)
BUN SERPL-MCNC: 19 MG/DL (ref 8–22)
CALCIUM SERPL-MCNC: 9.1 MG/DL (ref 8.5–10.5)
CHLORIDE SERPL-SCNC: 98 MMOL/L (ref 96–112)
CHOLEST SERPL-MCNC: 193 MG/DL (ref 100–199)
CO2 SERPL-SCNC: 28 MMOL/L (ref 20–33)
CREAT SERPL-MCNC: 0.73 MG/DL (ref 0.5–1.4)
EST. AVERAGE GLUCOSE BLD GHB EST-MCNC: 114 MG/DL
FASTING STATUS PATIENT QL REPORTED: NORMAL
GFR SERPLBLD CREATININE-BSD FMLA CKD-EPI: 92 ML/MIN/1.73 M 2
GLOBULIN SER CALC-MCNC: 2.6 G/DL (ref 1.9–3.5)
GLUCOSE SERPL-MCNC: 93 MG/DL (ref 65–99)
HBA1C MFR BLD: 5.6 % (ref 4–5.6)
HDLC SERPL-MCNC: 60 MG/DL
LDLC SERPL CALC-MCNC: 116 MG/DL
POTASSIUM SERPL-SCNC: 4.4 MMOL/L (ref 3.6–5.5)
PROT SERPL-MCNC: 6.9 G/DL (ref 6–8.2)
SODIUM SERPL-SCNC: 136 MMOL/L (ref 135–145)
TRIGL SERPL-MCNC: 85 MG/DL (ref 0–149)

## 2022-04-13 PROCEDURE — 83036 HEMOGLOBIN GLYCOSYLATED A1C: CPT

## 2022-04-13 PROCEDURE — 80053 COMPREHEN METABOLIC PANEL: CPT

## 2022-04-13 PROCEDURE — 36415 COLL VENOUS BLD VENIPUNCTURE: CPT

## 2022-04-13 PROCEDURE — 80061 LIPID PANEL: CPT

## 2022-05-16 ENCOUNTER — OFFICE VISIT (OUTPATIENT)
Dept: MEDICAL GROUP | Facility: PHYSICIAN GROUP | Age: 62
End: 2022-05-16
Payer: COMMERCIAL

## 2022-05-16 VITALS
HEART RATE: 66 BPM | TEMPERATURE: 98.8 F | SYSTOLIC BLOOD PRESSURE: 120 MMHG | HEIGHT: 68 IN | OXYGEN SATURATION: 96 % | RESPIRATION RATE: 14 BRPM | DIASTOLIC BLOOD PRESSURE: 78 MMHG | BODY MASS INDEX: 24.1 KG/M2 | WEIGHT: 159 LBS

## 2022-05-16 DIAGNOSIS — M85.852 OSTEOPENIA OF NECK OF LEFT FEMUR: ICD-10-CM

## 2022-05-16 DIAGNOSIS — Z11.59 NEED FOR HEPATITIS C SCREENING TEST: ICD-10-CM

## 2022-05-16 DIAGNOSIS — E55.9 VITAMIN D DEFICIENCY: ICD-10-CM

## 2022-05-16 DIAGNOSIS — Z23 NEED FOR VACCINATION: ICD-10-CM

## 2022-05-16 DIAGNOSIS — I47.10 PAROXYSMAL SUPRAVENTRICULAR TACHYCARDIA (HCC): ICD-10-CM

## 2022-05-16 DIAGNOSIS — R73.03 PREDIABETES: ICD-10-CM

## 2022-05-16 DIAGNOSIS — I10 ESSENTIAL HYPERTENSION: ICD-10-CM

## 2022-05-16 PROBLEM — N30.00 ACUTE CYSTITIS WITHOUT HEMATURIA: Status: RESOLVED | Noted: 2019-03-21 | Resolved: 2022-05-16

## 2022-05-16 PROCEDURE — 99214 OFFICE O/P EST MOD 30 MIN: CPT | Performed by: NURSE PRACTITIONER

## 2022-05-16 RX ORDER — HYDROCHLOROTHIAZIDE 25 MG/1
25 TABLET ORAL DAILY
Qty: 90 TABLET | Refills: 3 | Status: SHIPPED | OUTPATIENT
Start: 2022-05-16 | End: 2023-04-23 | Stop reason: SDUPTHER

## 2022-05-16 RX ORDER — LISINOPRIL 20 MG/1
20 TABLET ORAL 2 TIMES DAILY
Qty: 180 TABLET | Refills: 3 | Status: SHIPPED | OUTPATIENT
Start: 2022-05-16 | End: 2023-04-23 | Stop reason: SDUPTHER

## 2022-05-16 RX ORDER — METOPROLOL SUCCINATE 100 MG/1
100 TABLET, EXTENDED RELEASE ORAL 2 TIMES DAILY
Qty: 180 TABLET | Refills: 3 | Status: SHIPPED | OUTPATIENT
Start: 2022-05-16 | End: 2023-04-23 | Stop reason: SDUPTHER

## 2022-05-16 ASSESSMENT — FIBROSIS 4 INDEX: FIB4 SCORE: 1.11

## 2022-05-16 ASSESSMENT — PATIENT HEALTH QUESTIONNAIRE - PHQ9: CLINICAL INTERPRETATION OF PHQ2 SCORE: 0

## 2022-05-16 NOTE — PROGRESS NOTES
"Subjective:     CC:   Chief Complaint   Patient presents with   • Establish Care   • Lab Results       HPI:   Fatoumata ALBERTO presents today with    Vitamin D deficiency  Vit d was low several years ago; patient will complete vitamin D lab in the near future and I will advise her on what dose to take   will send message through [employer]    Prediabetes  Previous A1c was 5.8 but most recent labs last month  decreased to 5.6  Advised patient continue with her healthy dietary changes and consider cinnamon and chromium in  a supplement called Cinsulin    PSVT (paroxysmal supraventricular tachycardia) (CMS-[facility])  Was followed by cardiology regularly but has not seen them in a little while because she has been feeling well     will continue to monitor for symptoms including high blood pressure     advised her to let me know if her blood pressure is consistently over 130/90    All meds refilled today          ROS per HPI    Objective:     Exam:  /78 (BP Location: Left arm, Patient Position: Sitting, BP Cuff Size: Adult)   Pulse 66   Temp 37.1 °C (98.8 °F) (Temporal)   Resp 14   Ht 1.727 m (5' 8\")   Wt 72.1 kg (159 lb) Comment: w/ shoes  SpO2 96%   BMI 24.18 kg/m²  Body mass index is 24.18 kg/m².    Physical Exam:  Constitutional: Well-developed and well-nourished female  in NAD. Not diaphoretic. No distress.   Skin: warm, dry, intact, no evidence of rash or concerning lesions  Head: Atraumatic without lesions.  Eyes: Conjunctivae normal;  Pupils are equal, round. No scleral icterus.   Ears:  External ears unremarkable.   Neck: Supple, trachea midline.   Cardiovascular: Regular rate and rhythm without murmur.   Pulmonary: Clear to ausculation. Normal effort. No rales, ronchi, or wheezing.  Extremities: No cyanosis, clubbing, erythema, nor edema.   Neurological: Alert and oriented x 3.   Psychiatric:  Behavior, mood, and affect are appropriate.        Assessment & Plan:     61 y.o. female with the following -see above  "       1. Osteopenia of neck of left femur  - DS-BONE DENSITY STUDY (DEXA); Future    2. Vitamin D deficiency  - VITAMIN D,25 HYDROXY; Future    3. Prediabetes    4. PSVT (paroxysmal supraventricular tachycardia) (CMS-[facility])         Return in about 3 months (around 8/16/2022) for pap smear then annual check in unless needed sooner .    Please note that this dictation was created using voice recognition software. I have made every reasonable attempt to correct obvious errors, but I expect that there are errors of grammar and possibly content that I did not discover before finalizing the note.

## 2022-05-16 NOTE — ASSESSMENT & PLAN NOTE
Previous A1c was 5.8 but most recent labs last month  decreased to 5.6  Advised patient continue with her healthy dietary changes and consider cinnamon and chromium in  a supplement called Cinsulin

## 2022-05-16 NOTE — ASSESSMENT & PLAN NOTE
Was followed by cardiology regularly but has not seen them in a little while because she has been feeling well     will continue to monitor for symptoms including high blood pressure     advised her to let me know if her blood pressure is consistently over 130/90    All meds refilled today

## 2022-05-16 NOTE — ASSESSMENT & PLAN NOTE
Vit d was low several years ago; patient will complete vitamin D lab in the near future and I will advise her on what dose to take   will send message through [employer]

## 2022-06-01 ENCOUNTER — HOSPITAL ENCOUNTER (OUTPATIENT)
Dept: LAB | Facility: MEDICAL CENTER | Age: 62
End: 2022-06-01
Attending: NURSE PRACTITIONER
Payer: COMMERCIAL

## 2022-06-01 DIAGNOSIS — E55.9 VITAMIN D DEFICIENCY: ICD-10-CM

## 2022-06-01 LAB — 25(OH)D3 SERPL-MCNC: 50 NG/ML (ref 30–100)

## 2022-06-01 PROCEDURE — 82306 VITAMIN D 25 HYDROXY: CPT

## 2022-06-01 PROCEDURE — 36415 COLL VENOUS BLD VENIPUNCTURE: CPT

## 2022-07-08 ENCOUNTER — HOSPITAL ENCOUNTER (OUTPATIENT)
Dept: RADIOLOGY | Facility: MEDICAL CENTER | Age: 62
End: 2022-07-08
Attending: NURSE PRACTITIONER
Payer: COMMERCIAL

## 2022-07-08 DIAGNOSIS — M85.852 OSTEOPENIA OF NECK OF LEFT FEMUR: ICD-10-CM

## 2022-07-08 PROCEDURE — 77080 DXA BONE DENSITY AXIAL: CPT

## 2022-11-21 ENCOUNTER — OFFICE VISIT (OUTPATIENT)
Dept: MEDICAL GROUP | Facility: PHYSICIAN GROUP | Age: 62
End: 2022-11-21
Payer: COMMERCIAL

## 2022-11-21 VITALS
OXYGEN SATURATION: 95 % | WEIGHT: 160 LBS | TEMPERATURE: 98 F | SYSTOLIC BLOOD PRESSURE: 122 MMHG | BODY MASS INDEX: 24.25 KG/M2 | HEIGHT: 68 IN | HEART RATE: 67 BPM | DIASTOLIC BLOOD PRESSURE: 60 MMHG | RESPIRATION RATE: 18 BRPM

## 2022-11-21 DIAGNOSIS — Z12.31 ENCOUNTER FOR SCREENING MAMMOGRAM FOR MALIGNANT NEOPLASM OF BREAST: ICD-10-CM

## 2022-11-21 DIAGNOSIS — Z00.00 HEALTH CARE MAINTENANCE: ICD-10-CM

## 2022-11-21 DIAGNOSIS — M25.521 RIGHT ELBOW PAIN: ICD-10-CM

## 2022-11-21 DIAGNOSIS — R73.03 PREDIABETES: ICD-10-CM

## 2022-11-21 DIAGNOSIS — E55.9 VITAMIN D DEFICIENCY: ICD-10-CM

## 2022-11-21 DIAGNOSIS — R53.83 OTHER FATIGUE: ICD-10-CM

## 2022-11-21 DIAGNOSIS — Z11.59 ENCOUNTER FOR HEPATITIS C SCREENING TEST FOR LOW RISK PATIENT: ICD-10-CM

## 2022-11-21 DIAGNOSIS — I10 PRIMARY HYPERTENSION: ICD-10-CM

## 2022-11-21 DIAGNOSIS — Z00.00 HEALTHCARE MAINTENANCE: ICD-10-CM

## 2022-11-21 DIAGNOSIS — E78.00 ELEVATED LDL CHOLESTEROL LEVEL: ICD-10-CM

## 2022-11-21 PROCEDURE — 99213 OFFICE O/P EST LOW 20 MIN: CPT | Performed by: NURSE PRACTITIONER

## 2022-11-21 RX ORDER — DOXYCYCLINE HYCLATE 50 MG/1
TABLET, FILM COATED ORAL
COMMUNITY
Start: 2022-11-01 | End: 2023-07-12

## 2022-11-21 RX ORDER — METRONIDAZOLE 10 MG/G
GEL TOPICAL
COMMUNITY
Start: 2022-11-04 | End: 2023-07-12

## 2022-11-21 ASSESSMENT — FIBROSIS 4 INDEX: FIB4 SCORE: 1.13

## 2022-11-21 NOTE — ASSESSMENT & PLAN NOTE
Reports noticing some right elbow pain recently; no increase in activity other than raking leaves  Sending diclofenac  Cont to monitor  Consider xray and PT if continues

## 2022-11-21 NOTE — PROGRESS NOTES
"Subjective:     CC:   Chief Complaint   Patient presents with    Hypertension       HPI:   Fatoumata ALBERTO presents today with    Hypertension  Well controlled; no changes in lisinopril and metoprolol   Cont to monitor w/goal of no >130/90    Prediabetes  Next labs due mid April; cont to work on healthy eating choices; last A1c was 5.6    Elevated LDL cholesterol level  LDL was 112 at her last labs  Will do lipid panel w/annual labs mid 2023    Health care maintenance  Up to date on immunizations  mammo ordered-due in 2023    Right elbow pain  Reports noticing some right elbow pain recently; no increase in activity other than raking leaves  Sending diclofenac  Cont to monitor  Consider xray and PT if continues              ROS per HPI    Objective:     Exam:  /60   Pulse 67   Temp 36.7 °C (98 °F) (Temporal)   Resp 18   Ht 1.727 m (5' 8\")   Wt 72.6 kg (160 lb)   SpO2 95%   BMI 24.33 kg/m²  Body mass index is 24.33 kg/m².    Physical Exam:  Constitutional: Well-developed and well-nourished female Not diaphoretic. No distress.   Skin: warm, dry, intact, no evidence of rash or concerning lesions  Head: Atraumatic without lesions.  Eyes: Conjunctivae are normal. Pupils are equal, round. No scleral icterus.   Ears:  External ears unremarkable.   Neck: Supple, trachea midline. No thyromegaly present. No cervical or supraclavicular lymphadenopathy.  Cardiovascular: Regular rate and rhythm without murmur.   Pulmonary: Clear to ausculation. Normal effort. No rales, ronchi, or wheezing.  Extremities: No cyanosis, clubbing, erythema, nor edema.   Neurological: Alert and oriented x 3.   Psychiatric:  Behavior, mood, and affect are appropriate.         Assessment & Plan:     62 y.o. female with the following - see above     1. Encounter for hepatitis C screening test for low risk patient  - HCV Scrn ( [id number] 1xLife); Future    2. Healthcare maintenance    3. Encounter for screening mammogram for malignant " neoplasm of breast  - MA-SCREENING MAMMO BILAT W/TOMOSYNTHESIS W/CAD; Future    4. Right elbow pain  - diclofenac sodium (VOLTAREN) 1 % Gel; Apply thin layer to painful joints up to 4x day  Dispense: 150 g; Refill: 3    5. Primary hypertension  Monitor     6. Vitamin D deficiency  - VITAMIN D,25 HYDROXY (DEFICIENCY); Future    7. Prediabetes  - Comp Metabolic Panel; Future  - HEMOGLOBIN A1C; Future    8. Elevated LDL cholesterol level  - Lipid Profile; Future    9. Other fatigue  - CBC WITH DIFFERENTIAL; Future  - Comp Metabolic Panel; Future    10. Health care maintenance  See above         Return in about 6 months (around 5/21/2023). To review labs, mammo results    Please note that this dictation was created using voice recognition software. I have made every reasonable attempt to correct obvious errors, but I expect that there are errors of grammar and possibly content that I did not discover before finalizing the note.

## 2022-11-23 ENCOUNTER — TELEPHONE (OUTPATIENT)
Dept: URGENT CARE | Facility: PHYSICIAN GROUP | Age: 62
End: 2022-11-23

## 2023-04-11 ENCOUNTER — HOSPITAL ENCOUNTER (OUTPATIENT)
Dept: LAB | Facility: MEDICAL CENTER | Age: 63
End: 2023-04-11
Attending: NURSE PRACTITIONER
Payer: COMMERCIAL

## 2023-04-11 DIAGNOSIS — Z11.59 ENCOUNTER FOR HEPATITIS C SCREENING TEST FOR LOW RISK PATIENT: ICD-10-CM

## 2023-04-11 DIAGNOSIS — E78.00 ELEVATED LDL CHOLESTEROL LEVEL: ICD-10-CM

## 2023-04-11 DIAGNOSIS — R53.83 OTHER FATIGUE: ICD-10-CM

## 2023-04-11 DIAGNOSIS — E55.9 VITAMIN D DEFICIENCY: ICD-10-CM

## 2023-04-11 DIAGNOSIS — R73.03 PREDIABETES: ICD-10-CM

## 2023-04-11 LAB
ALBUMIN SERPL BCP-MCNC: 4 G/DL (ref 3.2–4.9)
ALBUMIN/GLOB SERPL: 1.4 G/DL
ALP SERPL-CCNC: 63 U/L (ref 30–99)
ALT SERPL-CCNC: 20 U/L (ref 2–50)
ANION GAP SERPL CALC-SCNC: 10 MMOL/L (ref 7–16)
AST SERPL-CCNC: 20 U/L (ref 12–45)
BASOPHILS # BLD AUTO: 0.9 % (ref 0–1.8)
BASOPHILS # BLD: 0.05 K/UL (ref 0–0.12)
BILIRUB SERPL-MCNC: 0.8 MG/DL (ref 0.1–1.5)
BUN SERPL-MCNC: 16 MG/DL (ref 8–22)
CALCIUM ALBUM COR SERPL-MCNC: 9.1 MG/DL (ref 8.5–10.5)
CALCIUM SERPL-MCNC: 9.1 MG/DL (ref 8.5–10.5)
CHLORIDE SERPL-SCNC: 103 MMOL/L (ref 96–112)
CHOLEST SERPL-MCNC: 195 MG/DL (ref 100–199)
CO2 SERPL-SCNC: 27 MMOL/L (ref 20–33)
CREAT SERPL-MCNC: 0.78 MG/DL (ref 0.5–1.4)
EOSINOPHIL # BLD AUTO: 0.12 K/UL (ref 0–0.51)
EOSINOPHIL NFR BLD: 2.2 % (ref 0–6.9)
ERYTHROCYTE [DISTWIDTH] IN BLOOD BY AUTOMATED COUNT: 40.9 FL (ref 35.9–50)
EST. AVERAGE GLUCOSE BLD GHB EST-MCNC: 120 MG/DL
FASTING STATUS PATIENT QL REPORTED: NORMAL
GFR SERPLBLD CREATININE-BSD FMLA CKD-EPI: 84 ML/MIN/1.73 M 2
GLOBULIN SER CALC-MCNC: 2.8 G/DL (ref 1.9–3.5)
GLUCOSE SERPL-MCNC: 107 MG/DL (ref 65–99)
HBA1C MFR BLD: 5.8 % (ref 4–5.6)
HCT VFR BLD AUTO: 43.6 % (ref 37–47)
HDLC SERPL-MCNC: 56 MG/DL
HGB BLD-MCNC: 14.3 G/DL (ref 12–16)
IMM GRANULOCYTES # BLD AUTO: 0.01 K/UL (ref 0–0.11)
IMM GRANULOCYTES NFR BLD AUTO: 0.2 % (ref 0–0.9)
LDLC SERPL CALC-MCNC: 112 MG/DL
LYMPHOCYTES # BLD AUTO: 1.95 K/UL (ref 1–4.8)
LYMPHOCYTES NFR BLD: 36.4 % (ref 22–41)
MCH RBC QN AUTO: 30 PG (ref 27–33)
MCHC RBC AUTO-ENTMCNC: 32.8 G/DL (ref 33.6–35)
MCV RBC AUTO: 91.4 FL (ref 81.4–97.8)
MONOCYTES # BLD AUTO: 0.42 K/UL (ref 0–0.85)
MONOCYTES NFR BLD AUTO: 7.9 % (ref 0–13.4)
NEUTROPHILS # BLD AUTO: 2.8 K/UL (ref 2–7.15)
NEUTROPHILS NFR BLD: 52.4 % (ref 44–72)
NRBC # BLD AUTO: 0 K/UL
NRBC BLD-RTO: 0 /100 WBC
PLATELET # BLD AUTO: 258 K/UL (ref 164–446)
PMV BLD AUTO: 10.3 FL (ref 9–12.9)
POTASSIUM SERPL-SCNC: 4.4 MMOL/L (ref 3.6–5.5)
PROT SERPL-MCNC: 6.8 G/DL (ref 6–8.2)
RBC # BLD AUTO: 4.77 M/UL (ref 4.2–5.4)
SODIUM SERPL-SCNC: 140 MMOL/L (ref 135–145)
TRIGL SERPL-MCNC: 134 MG/DL (ref 0–149)
WBC # BLD AUTO: 5.4 K/UL (ref 4.8–10.8)

## 2023-04-11 PROCEDURE — 80061 LIPID PANEL: CPT

## 2023-04-11 PROCEDURE — 82306 VITAMIN D 25 HYDROXY: CPT

## 2023-04-11 PROCEDURE — 85025 COMPLETE CBC W/AUTO DIFF WBC: CPT

## 2023-04-11 PROCEDURE — 83036 HEMOGLOBIN GLYCOSYLATED A1C: CPT

## 2023-04-11 PROCEDURE — 36415 COLL VENOUS BLD VENIPUNCTURE: CPT

## 2023-04-11 PROCEDURE — 80053 COMPREHEN METABOLIC PANEL: CPT

## 2023-04-11 PROCEDURE — G0472 HEP C SCREEN HIGH RISK/OTHER: HCPCS

## 2023-04-12 LAB
25(OH)D3 SERPL-MCNC: 62 NG/ML (ref 30–100)
HCV AB SER QL: NORMAL

## 2023-04-18 ENCOUNTER — HOSPITAL ENCOUNTER (OUTPATIENT)
Dept: RADIOLOGY | Facility: MEDICAL CENTER | Age: 63
End: 2023-04-18
Attending: NURSE PRACTITIONER
Payer: COMMERCIAL

## 2023-04-18 DIAGNOSIS — Z12.31 ENCOUNTER FOR SCREENING MAMMOGRAM FOR MALIGNANT NEOPLASM OF BREAST: ICD-10-CM

## 2023-04-18 PROCEDURE — 77063 BREAST TOMOSYNTHESIS BI: CPT

## 2023-04-23 DIAGNOSIS — I10 ESSENTIAL HYPERTENSION: ICD-10-CM

## 2023-04-24 RX ORDER — HYDROCHLOROTHIAZIDE 25 MG/1
25 TABLET ORAL DAILY
Qty: 90 TABLET | Refills: 3 | Status: SHIPPED | OUTPATIENT
Start: 2023-04-24

## 2023-04-24 RX ORDER — LISINOPRIL 20 MG/1
20 TABLET ORAL 2 TIMES DAILY
Qty: 180 TABLET | Refills: 3 | Status: SHIPPED | OUTPATIENT
Start: 2023-04-24

## 2023-04-24 RX ORDER — METOPROLOL SUCCINATE 100 MG/1
100 TABLET, EXTENDED RELEASE ORAL 2 TIMES DAILY
Qty: 180 TABLET | Refills: 3 | Status: SHIPPED | OUTPATIENT
Start: 2023-04-24

## 2023-04-24 NOTE — TELEPHONE ENCOUNTER
Received request via: Pharmacy    Was the patient seen in the last year in this department? Yes    Does the patient have an active prescription (recently filled or refills available) for medication(s) requested? No    Does the patient have [facility] and need 100 day supply (blood pressure, diabetes and cholesterol meds only)? Patient does not have SCP    Last office Visit:11/21/2022  Last Labs: 04/11/2023

## 2023-07-12 ENCOUNTER — OFFICE VISIT (OUTPATIENT)
Dept: URGENT CARE | Facility: PHYSICIAN GROUP | Age: 63
End: 2023-07-12
Payer: COMMERCIAL

## 2023-07-12 ENCOUNTER — HOSPITAL ENCOUNTER (OUTPATIENT)
Facility: MEDICAL CENTER | Age: 63
End: 2023-07-12
Attending: NURSE PRACTITIONER
Payer: COMMERCIAL

## 2023-07-12 VITALS
BODY MASS INDEX: 23.79 KG/M2 | HEART RATE: 63 BPM | DIASTOLIC BLOOD PRESSURE: 80 MMHG | OXYGEN SATURATION: 97 % | WEIGHT: 157 LBS | RESPIRATION RATE: 14 BRPM | TEMPERATURE: 97 F | SYSTOLIC BLOOD PRESSURE: 122 MMHG | HEIGHT: 68 IN

## 2023-07-12 DIAGNOSIS — R39.9 UTI SYMPTOMS: ICD-10-CM

## 2023-07-12 DIAGNOSIS — N39.0 URINARY TRACT INFECTION WITH HEMATURIA, SITE UNSPECIFIED: ICD-10-CM

## 2023-07-12 DIAGNOSIS — R31.9 URINARY TRACT INFECTION WITH HEMATURIA, SITE UNSPECIFIED: ICD-10-CM

## 2023-07-12 LAB
APPEARANCE UR: CLEAR
BILIRUB UR STRIP-MCNC: NEGATIVE MG/DL
COLOR UR AUTO: NORMAL
GLUCOSE UR STRIP.AUTO-MCNC: 100 MG/DL
KETONES UR STRIP.AUTO-MCNC: NORMAL MG/DL
LEUKOCYTE ESTERASE UR QL STRIP.AUTO: NORMAL
NITRITE UR QL STRIP.AUTO: POSITIVE
PH UR STRIP.AUTO: 5.5 [PH] (ref 5–8)
PROT UR QL STRIP: 100 MG/DL
RBC UR QL AUTO: NORMAL
SP GR UR STRIP.AUTO: <=1.005
UROBILINOGEN UR STRIP-MCNC: 2 MG/DL

## 2023-07-12 PROCEDURE — 87077 CULTURE AEROBIC IDENTIFY: CPT

## 2023-07-12 PROCEDURE — 99214 OFFICE O/P EST MOD 30 MIN: CPT | Performed by: NURSE PRACTITIONER

## 2023-07-12 PROCEDURE — 81002 URINALYSIS NONAUTO W/O SCOPE: CPT | Performed by: NURSE PRACTITIONER

## 2023-07-12 PROCEDURE — 3079F DIAST BP 80-89 MM HG: CPT | Performed by: NURSE PRACTITIONER

## 2023-07-12 PROCEDURE — 87086 URINE CULTURE/COLONY COUNT: CPT

## 2023-07-12 PROCEDURE — 3074F SYST BP LT 130 MM HG: CPT | Performed by: NURSE PRACTITIONER

## 2023-07-12 PROCEDURE — 87186 SC STD MICRODIL/AGAR DIL: CPT

## 2023-07-12 RX ORDER — SULFAMETHOXAZOLE AND TRIMETHOPRIM 800; 160 MG/1; MG/1
1 TABLET ORAL 2 TIMES DAILY
Qty: 14 TABLET | Refills: 0 | Status: SHIPPED | OUTPATIENT
Start: 2023-07-12 | End: 2023-07-19

## 2023-07-12 ASSESSMENT — ENCOUNTER SYMPTOMS
FLANK PAIN: 0
CHILLS: 0
NEUROLOGICAL NEGATIVE: 1
GASTROINTESTINAL NEGATIVE: 1
BACK PAIN: 1
FEVER: 0
CONSTITUTIONAL NEGATIVE: 1
NAUSEA: 0
VOMITING: 0
ABDOMINAL PAIN: 0

## 2023-07-12 ASSESSMENT — VISUAL ACUITY: OU: 1

## 2023-07-12 ASSESSMENT — FIBROSIS 4 INDEX: FIB4 SCORE: 1.11

## 2023-07-12 NOTE — PROGRESS NOTES
Subjective:     Fatoumata Gutiérrez is a 62 y.o. female who presents for Dysuria (X2 days UTI, painful urination, lower back ache, frequency, burning sensation )       Dysuria   This is a new problem. The current episode started yesterday. The problem has been gradually worsening. Associated symptoms include urgency. Pertinent negatives include no chills, flank pain, nausea or vomiting. Treatments tried: Azo. The treatment provided moderate relief. There is no history of recurrent UTIs.     Hx of UTI 1-2 years ago, similar sx.    Unique test result dated 1/19/2021 reviewed: Urine culture with usual skin teresita    Unique test result dated 10/19/2017 reviewed: Urine culture with E. coli    Review of Systems   Constitutional: Negative.  Negative for chills and fever.   Gastrointestinal: Negative.  Negative for abdominal pain, nausea and vomiting.   Genitourinary:  Positive for dysuria and urgency. Negative for flank pain.   Musculoskeletal:  Positive for back pain.   Neurological: Negative.    All other systems reviewed and are negative.    Refer to HPI for additional details.    During this visit, appropriate PPE was worn, and hand hygiene was performed.    PMH:  has a past medical history of Anxiety (11/6/2015), Cervicalgia, Essential hypertension, malignant, FH: colon cancer, FH: stroke, Generalized anxiety disorder, Hypertension, Menopause syndrome (3/1/2012), Pap smear (03/13/2007), Premenstrual tension syndromes, S/P VH (vaginal hysterectomy) (07/2007), Screening mammogram (02/26/2008), Shortness of breath, Tachycardia, unspecified, and Ultrasound scan abnormal (3/15/2007).    MEDS:   Current Outpatient Medications:     sulfamethoxazole-trimethoprim (BACTRIM DS) 800-160 MG tablet, Take 1 Tablet by mouth 2 times a day for 7 days., Disp: 14 Tablet, Rfl: 0    hydroCHLOROthiazide (HYDRODIURIL) 25 MG Tab, Take 1 Tablet by mouth every day., Disp: 90 Tablet, Rfl: 3    lisinopril (PRINIVIL) 20 MG Tab, Take 1 Tablet by mouth 2  "times a day., Disp: 180 Tablet, Rfl: 3    metoprolol SR (TOPROL XL) 100 MG TABLET SR 24 HR, Take 1 Tablet by mouth 2 times a day., Disp: 180 Tablet, Rfl: 3    diclofenac sodium (VOLTAREN) 1 % Gel, Apply thin layer to painful joints up to 4x day, Disp: 150 g, Rfl: 3    VITAMIN D PO, Take  by mouth., Disp: , Rfl:     glucosamine Sulfate 500 MG Cap, Take 500 mg by mouth 3 times a day, with meals., Disp: , Rfl:     Multiple Vitamin (MULTI-VITAMIN PO), Take  by mouth., Disp: , Rfl:     Calcium 600 MG TABS, Take 1,200 mg by mouth every day., Disp: , Rfl:     ALLERGIES: No Known Allergies  SURGHX:   Past Surgical History:   Procedure Laterality Date    HYSTERECTOMY, VAGINAL      without BSO due to fibroids     SOCHX:  reports that she has never smoked. She has never used smokeless tobacco. She reports current alcohol use of about 1.2 oz of alcohol per week. She reports that she does not use drugs.    FH: Per HPI as applicable/pertinent.      Objective:     /80   Pulse 63   Temp 36.1 °C (97 °F) (Temporal)   Resp 14   Ht 1.727 m (5' 8\")   Wt 71.2 kg (157 lb)   SpO2 97%   BMI 23.87 kg/m²     Physical Exam  Nursing note reviewed.   Constitutional:       General: She is not in acute distress.     Appearance: She is well-developed. She is not ill-appearing or toxic-appearing.   Eyes:      General: Vision grossly intact.   Cardiovascular:      Rate and Rhythm: Normal rate.   Pulmonary:      Effort: Pulmonary effort is normal. No respiratory distress.   Musculoskeletal:         General: No deformity. Normal range of motion.   Skin:     Coloration: Skin is not pale.   Neurological:      Mental Status: She is alert and oriented to person, place, and time.      Motor: No weakness.   Psychiatric:         Behavior: Behavior normal. Behavior is cooperative.     UA: positive multiple flags, red (on Azo)      Assessment/Plan:     1. UTI symptoms  - POCT Urinalysis  - URINE CULTURE(NEW); Future    2. Urinary tract infection " with hematuria, site unspecified  - sulfamethoxazole-trimethoprim (BACTRIM DS) 800-160 MG tablet; Take 1 Tablet by mouth 2 times a day for 7 days.  Dispense: 14 Tablet; Refill: 0    Rx as above sent electronically. Increase fluids. Continue Azo PRN.    Differential diagnosis, natural history, supportive care, over-the-counter symptom management per 's instructions, close monitoring, and indications for immediate follow-up discussed.     All questions answered. Patient agrees with the plan of care.    Discharge summary provided via [employer].    Billing note: moderate complexity and moderate risk. Established patient. [id number]. Please refer to LOS tool for details.

## 2023-07-15 LAB
BACTERIA UR CULT: ABNORMAL
BACTERIA UR CULT: ABNORMAL
SIGNIFICANT IND 70042: ABNORMAL
SITE SITE: ABNORMAL
SOURCE SOURCE: ABNORMAL

## 2023-07-16 ENCOUNTER — PATIENT MESSAGE (OUTPATIENT)
Dept: URGENT CARE | Facility: PHYSICIAN GROUP | Age: 63
End: 2023-07-16
Payer: COMMERCIAL

## 2023-07-16 DIAGNOSIS — N39.0 E. COLI UTI: ICD-10-CM

## 2023-07-16 DIAGNOSIS — B96.20 E. COLI UTI: ICD-10-CM

## 2023-07-16 RX ORDER — NITROFURANTOIN 25; 75 MG/1; MG/1
100 CAPSULE ORAL 2 TIMES DAILY
Qty: 10 CAPSULE | Refills: 0 | Status: SHIPPED | OUTPATIENT
Start: 2023-07-16 | End: 2023-07-21

## 2023-08-03 RX ORDER — NITROFURANTOIN 25; 75 MG/1; MG/1
100 CAPSULE ORAL 2 TIMES DAILY
Qty: 10 CAPSULE | Refills: 0 | Status: SHIPPED | OUTPATIENT
Start: 2023-08-03 | End: 2023-08-08

## 2023-09-02 ENCOUNTER — OFFICE VISIT (OUTPATIENT)
Dept: URGENT CARE | Facility: PHYSICIAN GROUP | Age: 63
End: 2023-09-02

## 2023-09-02 VITALS
TEMPERATURE: 98 F | OXYGEN SATURATION: 96 % | DIASTOLIC BLOOD PRESSURE: 78 MMHG | SYSTOLIC BLOOD PRESSURE: 120 MMHG | HEIGHT: 68 IN | WEIGHT: 158 LBS | BODY MASS INDEX: 23.95 KG/M2 | HEART RATE: 65 BPM | RESPIRATION RATE: 16 BRPM

## 2023-09-02 DIAGNOSIS — S39.012A BACK STRAIN, INITIAL ENCOUNTER: ICD-10-CM

## 2023-09-02 PROCEDURE — 99213 OFFICE O/P EST LOW 20 MIN: CPT | Performed by: FAMILY MEDICINE

## 2023-09-02 PROCEDURE — 3078F DIAST BP <80 MM HG: CPT | Performed by: FAMILY MEDICINE

## 2023-09-02 PROCEDURE — 3074F SYST BP LT 130 MM HG: CPT | Performed by: FAMILY MEDICINE

## 2023-09-02 PROCEDURE — 1125F AMNT PAIN NOTED PAIN PRSNT: CPT | Performed by: FAMILY MEDICINE

## 2023-09-02 RX ORDER — CYCLOBENZAPRINE HCL 10 MG
TABLET ORAL
Qty: 21 TABLET | Refills: 0 | Status: SHIPPED | OUTPATIENT
Start: 2023-09-02

## 2023-09-02 RX ORDER — PREDNISONE 20 MG/1
TABLET ORAL
Qty: 10 TABLET | Refills: 0 | Status: SHIPPED | OUTPATIENT
Start: 2023-09-02

## 2023-09-02 ASSESSMENT — FIBROSIS 4 INDEX: FIB4 SCORE: 1.13

## 2023-09-02 ASSESSMENT — PAIN SCALES - GENERAL: PAINLEVEL: 8=MODERATE-SEVERE PAIN

## 2023-09-02 NOTE — PROGRESS NOTES
Chief Complaint:    Chief Complaint   Patient presents with    Pain     was raking leaves and felt pain go up side 2 days ago, now it's progressed around lower back, worse at night       History of Present Illness:    Raking leaves on 8/30/23, felt pain in left lower back. Since then, has had pain across lower back, sometimes feels it in the front of her trunk at the same level. Took Tylenol for the symptoms, including today. No NSAIDs taken. No radiating pain down legs. No loss of bowel/bladder control. No saddle anesthesia.      Past Medical History:    Past Medical History:   Diagnosis Date    Anxiety 11/6/2015    Cervicalgia     Essential hypertension, malignant     FH: colon cancer     maternal, typically diagnosed in 50's    FH: stroke     paternal     Generalized anxiety disorder     Hypertension     Menopause syndrome 3/1/2012    Pap smear 03/13/2007    Premenstrual tension syndromes     S/P VH (vaginal hysterectomy) 07/2007    secondary  to menorrhagia without BSO    Screening mammogram 02/26/2008    Shortness of breath     Tachycardia, unspecified     Ultrasound scan abnormal 3/15/2007    Pelvic, enlarged uterus with miltiple masses in the uterine wall consisten with leiomyas     Past Surgical History:    Past Surgical History:   Procedure Laterality Date    HYSTERECTOMY, VAGINAL      without BSO due to fibroids     Social History:    Social History     Socioeconomic History    Marital status:      Spouse name: Not on file    Number of children: Not on file    Years of education: Not on file    Highest education level: Not on file   Occupational History    Not on file   Tobacco Use    Smoking status: Never    Smokeless tobacco: Never   Vaping Use    Vaping Use: Never used   Substance and Sexual Activity    Alcohol use: Yes     Alcohol/week: 1.2 oz     Types: 2 Standard drinks or equivalent per week     Comment: rare    Drug use: No    Sexual activity: Yes     Partners: Male     Birth  control/protection: Surgical     Comment: ,    Other Topics Concern     Service No    Blood Transfusions No    Caffeine Concern No    Occupational Exposure No    Hobby Hazards No    Sleep Concern No    Stress Concern No    Weight Concern No    Special Diet No    Back Care No    Exercise Yes     Comment: walking 30 minutes daily    Bike Helmet No    Seat Belt Yes    Self-Exams Yes   Social History Narrative    Teacher,      Social Determinants of Health     Financial Resource Strain: Not on file   Food Insecurity: Not on file   Transportation Needs: Not on file   Physical Activity: Not on file   Stress: Not on file   Social Connections: Not on file   Intimate Partner Violence: Not on file   Housing Stability: Not on file     Family History:    Family History   Problem Relation Age of Onset    Hypertension Mother     Arthritis Mother         severe osteoarthritis    Hypertension Father      Medications:    Current Outpatient Medications on File Prior to Visit   Medication Sig Dispense Refill    hydroCHLOROthiazide (HYDRODIURIL) 25 MG Tab Take 1 Tablet by mouth every day. 90 Tablet 3    lisinopril (PRINIVIL) 20 MG Tab Take 1 Tablet by mouth 2 times a day. 180 Tablet 3    metoprolol SR (TOPROL XL) 100 MG TABLET SR 24 HR Take 1 Tablet by mouth 2 times a day. 180 Tablet 3    diclofenac sodium (VOLTAREN) 1 % Gel Apply thin layer to painful joints up to 4x day 150 g 3    VITAMIN D PO Take  by mouth.      glucosamine Sulfate 500 MG Cap Take 500 mg by mouth 3 times a day, with meals.      Multiple Vitamin (MULTI-VITAMIN PO) Take  by mouth.      Calcium 600 MG TABS Take 1,200 mg by mouth every day.       No current facility-administered medications on file prior to visit.     Allergies:    No Known Allergies      Vitals:    Vitals:    09/02/23 1244   BP: 120/78   Pulse: 65   Resp: 16   Temp: 36.7 °C (98 °F)   TempSrc: Temporal   SpO2: 96%   Weight: 71.7 kg (158 lb)   Height: 1.727 m (5'  "8\")       Physical Exam:    Constitutional: Vital signs reviewed. Appears well-developed and well-nourished. No acute distress.   Eyes: Sclera white, conjunctivae clear.  ENT: External ears normal. Hearing normal.  Pulmonary/Chest: Respirations non-labored.  Musculoskeletal: Normal gait. Normal lumbar range of motion with mild discomfort in lower back on some movements. No tenderness to palpation. No muscular atrophy or weakness.  Neurological: Alert and oriented to person, place, and time. Muscle tone normal. Coordination normal. Light touch and sensation normal.   Skin: No rashes or lesions. Warm, dry, normal turgor.  Psychiatric: Normal mood and affect. Behavior is normal. Judgment and thought content normal.       Assessment / Plan & Medical Decision Makin. Back strain, initial encounter  - cyclobenzaprine (FLEXERIL) 10 mg Tab; 1 TAB BY MOUTH EVERY 8 HOURS ONLY IF NEEDED FOR PAIN, MUSCLE SPASM, AND/OR MUSCLE TIGHTNESS. MAY CAUSE DROWSINESS.  Dispense: 21 Tablet; Refill: 0  - predniSONE (DELTASONE) 20 MG Tab; 1 TAB BY MOUTH ONCE A DAY ONLY IF NEEDED FOR BACK PAIN TO HELP INFLAMMATION. TAKE WITH FOOD.  Dispense: 10 Tablet; Refill: 0       Discussed with her DDX, management options, and risks, benefits, and alternatives to treatment plan agreed upon.    Vicky leaves on 23, felt pain in left lower back. Since then, has had pain across lower back, sometimes feels it in the front of her trunk at the same level. Took Tylenol for the symptoms, including today. No NSAIDs taken. No radiating pain down legs. No loss of bowel/bladder control. No saddle anesthesia.    Normal lumbar range of motion with mild discomfort in lower back on some movements. No tenderness to palpation.     Likely MSK inflammation and/or muscle tightness/spasm as cause of symptoms.     Rec'd relative rest.    She prefers to use OTC meds first as needed.    May take over-the-counter Ibuprofen (Motrin or Advil) OR Naproxen (Aleve) as " needed for pain for anti-inflammatory effect.    May take over-the-counter Acetaminophen (Tylenol) as needed for pain.     Back-up Rxs for Prednisone for anti-inflammatory effect and Cyclobenzaprine for muscle relaxer she will fill and take if she feels need to use them.    Discussed expected course of duration, time for improvement, and to seek follow-up in Emergency Room, urgent care, or with PCP if getting worse at any time or not improving within expected time frame.

## 2023-10-02 ENCOUNTER — APPOINTMENT (OUTPATIENT)
Dept: MEDICAL GROUP | Facility: PHYSICIAN GROUP | Age: 63
End: 2023-10-02
Payer: COMMERCIAL

## 2024-06-03 ENCOUNTER — DOCUMENTATION (OUTPATIENT)
Dept: HEALTH INFORMATION MANAGEMENT | Facility: OTHER | Age: 64
End: 2024-06-03